# Patient Record
Sex: FEMALE | Race: BLACK OR AFRICAN AMERICAN | NOT HISPANIC OR LATINO | Employment: FULL TIME | URBAN - METROPOLITAN AREA
[De-identification: names, ages, dates, MRNs, and addresses within clinical notes are randomized per-mention and may not be internally consistent; named-entity substitution may affect disease eponyms.]

---

## 2017-01-23 ENCOUNTER — COMMUNICATION - HEALTHEAST (OUTPATIENT)
Dept: FAMILY MEDICINE | Facility: CLINIC | Age: 37
End: 2017-01-23

## 2017-01-23 DIAGNOSIS — K12.0 ORAL APHTHAE: ICD-10-CM

## 2017-01-23 DIAGNOSIS — J06.9 URI (UPPER RESPIRATORY INFECTION): ICD-10-CM

## 2017-03-01 ENCOUNTER — COMMUNICATION - HEALTHEAST (OUTPATIENT)
Dept: FAMILY MEDICINE | Facility: CLINIC | Age: 37
End: 2017-03-01

## 2017-03-01 DIAGNOSIS — K12.0 ORAL APHTHAE: ICD-10-CM

## 2017-03-07 ENCOUNTER — COMMUNICATION - HEALTHEAST (OUTPATIENT)
Dept: FAMILY MEDICINE | Facility: CLINIC | Age: 37
End: 2017-03-07

## 2017-03-07 DIAGNOSIS — K12.0 ORAL APHTHAE: ICD-10-CM

## 2017-03-23 ENCOUNTER — COMMUNICATION - HEALTHEAST (OUTPATIENT)
Dept: FAMILY MEDICINE | Facility: CLINIC | Age: 37
End: 2017-03-23

## 2017-03-23 DIAGNOSIS — K12.0 ORAL APHTHAE: ICD-10-CM

## 2017-03-28 ENCOUNTER — COMMUNICATION - HEALTHEAST (OUTPATIENT)
Dept: FAMILY MEDICINE | Facility: CLINIC | Age: 37
End: 2017-03-28

## 2017-03-28 ENCOUNTER — COMMUNICATION - HEALTHEAST (OUTPATIENT)
Dept: SCHEDULING | Facility: CLINIC | Age: 37
End: 2017-03-28

## 2017-03-28 DIAGNOSIS — K12.0 ORAL APHTHAE: ICD-10-CM

## 2017-03-28 DIAGNOSIS — B00.1 COLD SORE: ICD-10-CM

## 2017-03-29 ENCOUNTER — COMMUNICATION - HEALTHEAST (OUTPATIENT)
Dept: FAMILY MEDICINE | Facility: CLINIC | Age: 37
End: 2017-03-29

## 2017-03-29 DIAGNOSIS — K12.0 ORAL APHTHAE: ICD-10-CM

## 2017-04-24 ENCOUNTER — COMMUNICATION - HEALTHEAST (OUTPATIENT)
Dept: SCHEDULING | Facility: CLINIC | Age: 37
End: 2017-04-24

## 2017-04-24 ENCOUNTER — COMMUNICATION - HEALTHEAST (OUTPATIENT)
Dept: FAMILY MEDICINE | Facility: CLINIC | Age: 37
End: 2017-04-24

## 2017-04-24 DIAGNOSIS — K12.0 ORAL APHTHAE: ICD-10-CM

## 2017-05-19 ENCOUNTER — COMMUNICATION - HEALTHEAST (OUTPATIENT)
Dept: FAMILY MEDICINE | Facility: CLINIC | Age: 37
End: 2017-05-19

## 2017-05-19 DIAGNOSIS — K12.0 ORAL APHTHAE: ICD-10-CM

## 2017-06-07 ENCOUNTER — COMMUNICATION - HEALTHEAST (OUTPATIENT)
Dept: FAMILY MEDICINE | Facility: CLINIC | Age: 37
End: 2017-06-07

## 2017-06-07 DIAGNOSIS — K12.0 ORAL APHTHAE: ICD-10-CM

## 2017-07-12 ENCOUNTER — COMMUNICATION - HEALTHEAST (OUTPATIENT)
Dept: FAMILY MEDICINE | Facility: CLINIC | Age: 37
End: 2017-07-12

## 2017-07-12 ENCOUNTER — OFFICE VISIT - HEALTHEAST (OUTPATIENT)
Dept: FAMILY MEDICINE | Facility: CLINIC | Age: 37
End: 2017-07-12

## 2017-07-12 DIAGNOSIS — K12.0 ORAL APHTHAE: ICD-10-CM

## 2017-07-12 DIAGNOSIS — B00.2 HERPETIC GINGIVOSTOMATITIS: ICD-10-CM

## 2017-07-31 ENCOUNTER — COMMUNICATION - HEALTHEAST (OUTPATIENT)
Dept: FAMILY MEDICINE | Facility: CLINIC | Age: 37
End: 2017-07-31

## 2017-07-31 DIAGNOSIS — B00.2 HERPETIC GINGIVOSTOMATITIS: ICD-10-CM

## 2017-08-01 ENCOUNTER — COMMUNICATION - HEALTHEAST (OUTPATIENT)
Dept: FAMILY MEDICINE | Facility: CLINIC | Age: 37
End: 2017-08-01

## 2017-08-01 DIAGNOSIS — B00.2 HERPETIC GINGIVOSTOMATITIS: ICD-10-CM

## 2017-08-08 ENCOUNTER — COMMUNICATION - HEALTHEAST (OUTPATIENT)
Dept: FAMILY MEDICINE | Facility: CLINIC | Age: 37
End: 2017-08-08

## 2017-08-08 ENCOUNTER — RECORDS - HEALTHEAST (OUTPATIENT)
Dept: ADMINISTRATIVE | Facility: OTHER | Age: 37
End: 2017-08-08

## 2017-08-09 ENCOUNTER — COMMUNICATION - HEALTHEAST (OUTPATIENT)
Dept: FAMILY MEDICINE | Facility: CLINIC | Age: 37
End: 2017-08-09

## 2017-08-11 ENCOUNTER — OFFICE VISIT - HEALTHEAST (OUTPATIENT)
Dept: FAMILY MEDICINE | Facility: CLINIC | Age: 37
End: 2017-08-11

## 2017-08-11 DIAGNOSIS — B00.1 COLD SORE: ICD-10-CM

## 2017-08-11 DIAGNOSIS — K12.0 ORAL APHTHAE: ICD-10-CM

## 2017-08-11 DIAGNOSIS — B00.2 HERPETIC GINGIVOSTOMATITIS: ICD-10-CM

## 2017-08-11 ASSESSMENT — MIFFLIN-ST. JEOR: SCORE: 1230.6

## 2017-08-31 ENCOUNTER — OFFICE VISIT - HEALTHEAST (OUTPATIENT)
Dept: FAMILY MEDICINE | Facility: CLINIC | Age: 37
End: 2017-08-31

## 2017-08-31 DIAGNOSIS — M32.9 DISEASE OF GINGIVA DUE TO LUPUS ERYTHEMATOSUS (H): ICD-10-CM

## 2017-08-31 DIAGNOSIS — K06.9 DISEASE OF GINGIVA DUE TO LUPUS ERYTHEMATOSUS (H): ICD-10-CM

## 2017-08-31 ASSESSMENT — MIFFLIN-ST. JEOR: SCORE: 1249.88

## 2017-09-01 ENCOUNTER — COMMUNICATION - HEALTHEAST (OUTPATIENT)
Dept: FAMILY MEDICINE | Facility: CLINIC | Age: 37
End: 2017-09-01

## 2017-09-06 ENCOUNTER — COMMUNICATION - HEALTHEAST (OUTPATIENT)
Dept: FAMILY MEDICINE | Facility: CLINIC | Age: 37
End: 2017-09-06

## 2017-09-06 DIAGNOSIS — K12.0 ORAL APHTHAE: ICD-10-CM

## 2017-09-06 DIAGNOSIS — R76.8 ELEVATED ANTINUCLEAR ANTIBODY (ANA) LEVEL: ICD-10-CM

## 2017-09-11 ENCOUNTER — COMMUNICATION - HEALTHEAST (OUTPATIENT)
Dept: FAMILY MEDICINE | Facility: CLINIC | Age: 37
End: 2017-09-11

## 2017-09-11 DIAGNOSIS — K06.9 DISEASE OF GINGIVA DUE TO LUPUS ERYTHEMATOSUS (H): ICD-10-CM

## 2017-09-11 DIAGNOSIS — M32.9 DISEASE OF GINGIVA DUE TO LUPUS ERYTHEMATOSUS (H): ICD-10-CM

## 2017-09-15 ENCOUNTER — COMMUNICATION - HEALTHEAST (OUTPATIENT)
Dept: FAMILY MEDICINE | Facility: CLINIC | Age: 37
End: 2017-09-15

## 2017-09-15 DIAGNOSIS — K06.9 DISEASE OF GINGIVA DUE TO LUPUS ERYTHEMATOSUS (H): ICD-10-CM

## 2017-09-15 DIAGNOSIS — M32.9 DISEASE OF GINGIVA DUE TO LUPUS ERYTHEMATOSUS (H): ICD-10-CM

## 2017-09-26 ENCOUNTER — COMMUNICATION - HEALTHEAST (OUTPATIENT)
Dept: FAMILY MEDICINE | Facility: CLINIC | Age: 37
End: 2017-09-26

## 2017-09-26 DIAGNOSIS — M32.9 DISEASE OF GINGIVA DUE TO LUPUS ERYTHEMATOSUS (H): ICD-10-CM

## 2017-09-26 DIAGNOSIS — K06.9 DISEASE OF GINGIVA DUE TO LUPUS ERYTHEMATOSUS (H): ICD-10-CM

## 2017-09-29 ENCOUNTER — COMMUNICATION - HEALTHEAST (OUTPATIENT)
Dept: FAMILY MEDICINE | Facility: CLINIC | Age: 37
End: 2017-09-29

## 2017-09-29 DIAGNOSIS — M32.9 DISEASE OF GINGIVA DUE TO LUPUS ERYTHEMATOSUS (H): ICD-10-CM

## 2017-09-29 DIAGNOSIS — K06.9 DISEASE OF GINGIVA DUE TO LUPUS ERYTHEMATOSUS (H): ICD-10-CM

## 2017-10-01 ENCOUNTER — COMMUNICATION - HEALTHEAST (OUTPATIENT)
Dept: FAMILY MEDICINE | Facility: CLINIC | Age: 37
End: 2017-10-01

## 2017-10-01 DIAGNOSIS — K06.9 DISEASE OF GINGIVA DUE TO LUPUS ERYTHEMATOSUS (H): ICD-10-CM

## 2017-10-01 DIAGNOSIS — M32.9 DISEASE OF GINGIVA DUE TO LUPUS ERYTHEMATOSUS (H): ICD-10-CM

## 2017-10-02 ENCOUNTER — OFFICE VISIT - HEALTHEAST (OUTPATIENT)
Dept: FAMILY MEDICINE | Facility: CLINIC | Age: 37
End: 2017-10-02

## 2017-10-02 DIAGNOSIS — S09.93XA: ICD-10-CM

## 2017-10-02 DIAGNOSIS — H57.9 VISUAL SYMPTOMS: ICD-10-CM

## 2017-10-02 DIAGNOSIS — K06.9 DISEASE OF GINGIVA DUE TO LUPUS ERYTHEMATOSUS (H): ICD-10-CM

## 2017-10-02 DIAGNOSIS — M32.9 DISEASE OF GINGIVA DUE TO LUPUS ERYTHEMATOSUS (H): ICD-10-CM

## 2017-10-02 DIAGNOSIS — R76.8 ELEVATED ANTINUCLEAR ANTIBODY (ANA) LEVEL: ICD-10-CM

## 2017-10-02 ASSESSMENT — MIFFLIN-ST. JEOR: SCORE: 1218.13

## 2017-10-18 ENCOUNTER — COMMUNICATION - HEALTHEAST (OUTPATIENT)
Dept: FAMILY MEDICINE | Facility: CLINIC | Age: 37
End: 2017-10-18

## 2017-10-23 ENCOUNTER — COMMUNICATION - HEALTHEAST (OUTPATIENT)
Dept: FAMILY MEDICINE | Facility: CLINIC | Age: 37
End: 2017-10-23

## 2017-10-23 DIAGNOSIS — K06.9 DISEASE OF GINGIVA DUE TO LUPUS ERYTHEMATOSUS (H): ICD-10-CM

## 2017-10-23 DIAGNOSIS — M32.9 DISEASE OF GINGIVA DUE TO LUPUS ERYTHEMATOSUS (H): ICD-10-CM

## 2017-11-18 ENCOUNTER — COMMUNICATION - HEALTHEAST (OUTPATIENT)
Dept: FAMILY MEDICINE | Facility: CLINIC | Age: 37
End: 2017-11-18

## 2017-11-18 DIAGNOSIS — R76.8 ELEVATED ANTINUCLEAR ANTIBODY (ANA) LEVEL: ICD-10-CM

## 2017-11-24 ENCOUNTER — COMMUNICATION - HEALTHEAST (OUTPATIENT)
Dept: FAMILY MEDICINE | Facility: CLINIC | Age: 37
End: 2017-11-24

## 2017-11-24 DIAGNOSIS — M32.9 DISEASE OF GINGIVA DUE TO LUPUS ERYTHEMATOSUS (H): ICD-10-CM

## 2017-11-24 DIAGNOSIS — K06.9 DISEASE OF GINGIVA DUE TO LUPUS ERYTHEMATOSUS (H): ICD-10-CM

## 2017-12-27 ENCOUNTER — COMMUNICATION - HEALTHEAST (OUTPATIENT)
Dept: FAMILY MEDICINE | Facility: CLINIC | Age: 37
End: 2017-12-27

## 2017-12-27 DIAGNOSIS — K06.9 DISEASE OF GINGIVA DUE TO LUPUS ERYTHEMATOSUS (H): ICD-10-CM

## 2017-12-27 DIAGNOSIS — M32.9 DISEASE OF GINGIVA DUE TO LUPUS ERYTHEMATOSUS (H): ICD-10-CM

## 2018-01-05 ENCOUNTER — COMMUNICATION - HEALTHEAST (OUTPATIENT)
Dept: FAMILY MEDICINE | Facility: CLINIC | Age: 38
End: 2018-01-05

## 2018-01-05 DIAGNOSIS — K06.9 DISEASE OF GINGIVA DUE TO LUPUS ERYTHEMATOSUS (H): ICD-10-CM

## 2018-01-05 DIAGNOSIS — M32.9 DISEASE OF GINGIVA DUE TO LUPUS ERYTHEMATOSUS (H): ICD-10-CM

## 2018-01-11 ENCOUNTER — COMMUNICATION - HEALTHEAST (OUTPATIENT)
Dept: FAMILY MEDICINE | Facility: CLINIC | Age: 38
End: 2018-01-11

## 2018-01-11 DIAGNOSIS — K06.9 DISEASE OF GINGIVA DUE TO LUPUS ERYTHEMATOSUS (H): ICD-10-CM

## 2018-01-11 DIAGNOSIS — M32.9 DISEASE OF GINGIVA DUE TO LUPUS ERYTHEMATOSUS (H): ICD-10-CM

## 2018-01-26 ENCOUNTER — COMMUNICATION - HEALTHEAST (OUTPATIENT)
Dept: FAMILY MEDICINE | Facility: CLINIC | Age: 38
End: 2018-01-26

## 2018-01-26 DIAGNOSIS — K06.9 DISEASE OF GINGIVA DUE TO LUPUS ERYTHEMATOSUS (H): ICD-10-CM

## 2018-01-26 DIAGNOSIS — M32.9 DISEASE OF GINGIVA DUE TO LUPUS ERYTHEMATOSUS (H): ICD-10-CM

## 2018-02-08 ENCOUNTER — COMMUNICATION - HEALTHEAST (OUTPATIENT)
Dept: FAMILY MEDICINE | Facility: CLINIC | Age: 38
End: 2018-02-08

## 2018-02-08 DIAGNOSIS — K06.9 DISEASE OF GINGIVA DUE TO LUPUS ERYTHEMATOSUS (H): ICD-10-CM

## 2018-02-08 DIAGNOSIS — M32.9 DISEASE OF GINGIVA DUE TO LUPUS ERYTHEMATOSUS (H): ICD-10-CM

## 2018-02-15 ENCOUNTER — COMMUNICATION - HEALTHEAST (OUTPATIENT)
Dept: FAMILY MEDICINE | Facility: CLINIC | Age: 38
End: 2018-02-15

## 2018-02-15 DIAGNOSIS — M32.9 DISEASE OF GINGIVA DUE TO LUPUS ERYTHEMATOSUS (H): ICD-10-CM

## 2018-02-15 DIAGNOSIS — K06.9 DISEASE OF GINGIVA DUE TO LUPUS ERYTHEMATOSUS (H): ICD-10-CM

## 2018-03-21 ENCOUNTER — COMMUNICATION - HEALTHEAST (OUTPATIENT)
Dept: FAMILY MEDICINE | Facility: CLINIC | Age: 38
End: 2018-03-21

## 2018-03-21 DIAGNOSIS — K06.9 DISEASE OF GINGIVA DUE TO LUPUS ERYTHEMATOSUS (H): ICD-10-CM

## 2018-03-21 DIAGNOSIS — M32.9 DISEASE OF GINGIVA DUE TO LUPUS ERYTHEMATOSUS (H): ICD-10-CM

## 2018-03-23 ENCOUNTER — COMMUNICATION - HEALTHEAST (OUTPATIENT)
Dept: FAMILY MEDICINE | Facility: CLINIC | Age: 38
End: 2018-03-23

## 2018-03-23 DIAGNOSIS — M32.9 DISEASE OF GINGIVA DUE TO LUPUS ERYTHEMATOSUS (H): ICD-10-CM

## 2018-03-23 DIAGNOSIS — K06.9 DISEASE OF GINGIVA DUE TO LUPUS ERYTHEMATOSUS (H): ICD-10-CM

## 2018-04-16 ENCOUNTER — COMMUNICATION - HEALTHEAST (OUTPATIENT)
Dept: FAMILY MEDICINE | Facility: CLINIC | Age: 38
End: 2018-04-16

## 2018-04-16 DIAGNOSIS — K06.9 DISEASE OF GINGIVA DUE TO LUPUS ERYTHEMATOSUS (H): ICD-10-CM

## 2018-04-16 DIAGNOSIS — M32.9 DISEASE OF GINGIVA DUE TO LUPUS ERYTHEMATOSUS (H): ICD-10-CM

## 2018-04-17 ENCOUNTER — COMMUNICATION - HEALTHEAST (OUTPATIENT)
Dept: FAMILY MEDICINE | Facility: CLINIC | Age: 38
End: 2018-04-17

## 2018-04-17 DIAGNOSIS — K06.9 DISEASE OF GINGIVA DUE TO LUPUS ERYTHEMATOSUS (H): ICD-10-CM

## 2018-04-17 DIAGNOSIS — M32.9 DISEASE OF GINGIVA DUE TO LUPUS ERYTHEMATOSUS (H): ICD-10-CM

## 2018-04-23 ENCOUNTER — OFFICE VISIT - HEALTHEAST (OUTPATIENT)
Dept: FAMILY MEDICINE | Facility: CLINIC | Age: 38
End: 2018-04-23

## 2018-04-23 ENCOUNTER — AMBULATORY - HEALTHEAST (OUTPATIENT)
Dept: FAMILY MEDICINE | Facility: CLINIC | Age: 38
End: 2018-04-23

## 2018-04-23 DIAGNOSIS — R10.32 LEFT LOWER QUADRANT PAIN: ICD-10-CM

## 2018-04-23 DIAGNOSIS — M32.9 DISEASE OF GINGIVA DUE TO LUPUS ERYTHEMATOSUS (H): ICD-10-CM

## 2018-04-23 DIAGNOSIS — R29.898 LEG WEAKNESS: ICD-10-CM

## 2018-04-23 DIAGNOSIS — K13.79 ACUTE PAIN OF MOUTH: ICD-10-CM

## 2018-04-23 DIAGNOSIS — R76.8 ELEVATED ANTINUCLEAR ANTIBODY (ANA) LEVEL: ICD-10-CM

## 2018-04-23 DIAGNOSIS — R35.0 URINARY FREQUENCY: ICD-10-CM

## 2018-04-23 DIAGNOSIS — K06.9 DISEASE OF GINGIVA DUE TO LUPUS ERYTHEMATOSUS (H): ICD-10-CM

## 2018-04-23 DIAGNOSIS — D50.9 IRON DEFICIENCY ANEMIA: ICD-10-CM

## 2018-04-23 LAB
ALBUMIN SERPL-MCNC: 3.9 G/DL (ref 3.5–5)
ALBUMIN UR-MCNC: NEGATIVE MG/DL
ALP SERPL-CCNC: 88 U/L (ref 45–120)
ALT SERPL W P-5'-P-CCNC: 11 U/L (ref 0–45)
AMPHETAMINES UR QL SCN: NORMAL
ANION GAP SERPL CALCULATED.3IONS-SCNC: 11 MMOL/L (ref 5–18)
APPEARANCE UR: CLEAR
AST SERPL W P-5'-P-CCNC: 13 U/L (ref 0–40)
BACTERIA #/AREA URNS HPF: ABNORMAL HPF
BARBITURATES UR QL: NORMAL
BENZODIAZ UR QL: NORMAL
BILIRUB SERPL-MCNC: 0.4 MG/DL (ref 0–1)
BILIRUB UR QL STRIP: NEGATIVE
BUN SERPL-MCNC: 18 MG/DL (ref 8–22)
C REACTIVE PROTEIN LHE: 0.9 MG/DL (ref 0–0.8)
CALCIUM SERPL-MCNC: 9.2 MG/DL (ref 8.5–10.5)
CANNABINOIDS UR QL SCN: NORMAL
CHLORIDE BLD-SCNC: 106 MMOL/L (ref 98–107)
CO2 SERPL-SCNC: 21 MMOL/L (ref 22–31)
COCAINE UR QL: NORMAL
COLOR UR AUTO: YELLOW
CREAT SERPL-MCNC: 0.57 MG/DL (ref 0.6–1.1)
CREAT UR-MCNC: 112.8 MG/DL
ERYTHROCYTE [DISTWIDTH] IN BLOOD BY AUTOMATED COUNT: 17.1 % (ref 11–14.5)
ERYTHROCYTE [SEDIMENTATION RATE] IN BLOOD BY WESTERGREN METHOD: 37 MM/HR (ref 0–20)
FERRITIN SERPL-MCNC: 11 NG/ML (ref 10–130)
GFR SERPL CREATININE-BSD FRML MDRD: >60 ML/MIN/1.73M2
GLUCOSE BLD-MCNC: 81 MG/DL (ref 70–125)
GLUCOSE UR STRIP-MCNC: NEGATIVE MG/DL
HCT VFR BLD AUTO: 26.7 % (ref 35–47)
HGB BLD-MCNC: 8.6 G/DL (ref 12–16)
HGB UR QL STRIP: ABNORMAL
KETONES UR STRIP-MCNC: NEGATIVE MG/DL
LEUKOCYTE ESTERASE UR QL STRIP: NEGATIVE
MCH RBC QN AUTO: 25.9 PG (ref 27–34)
MCHC RBC AUTO-ENTMCNC: 32.2 G/DL (ref 32–36)
MCV RBC AUTO: 80 FL (ref 80–100)
METHADONE UR QL SCN: NORMAL
NITRATE UR QL: NEGATIVE
OPIATES UR QL SCN: NORMAL
OXYCODONE UR QL: NORMAL
PCP UR QL SCN: NORMAL
PH UR STRIP: 6 [PH] (ref 5–8)
PLATELET # BLD AUTO: 518 THOU/UL (ref 140–440)
PMV BLD AUTO: 6.9 FL (ref 7–10)
POTASSIUM BLD-SCNC: 4 MMOL/L (ref 3.5–5)
PROT SERPL-MCNC: 7.9 G/DL (ref 6–8)
RBC # BLD AUTO: 3.32 MILL/UL (ref 3.8–5.4)
RBC #/AREA URNS AUTO: ABNORMAL HPF
SODIUM SERPL-SCNC: 138 MMOL/L (ref 136–145)
SP GR UR STRIP: 1.02 (ref 1–1.03)
SQUAMOUS #/AREA URNS AUTO: ABNORMAL LPF
TSH SERPL DL<=0.005 MIU/L-ACNC: 0.47 UIU/ML (ref 0.3–5)
UROBILINOGEN UR STRIP-ACNC: ABNORMAL
WBC #/AREA URNS AUTO: ABNORMAL HPF
WBC: 4.4 THOU/UL (ref 4–11)

## 2018-04-23 ASSESSMENT — MIFFLIN-ST. JEOR: SCORE: 1286.17

## 2018-04-24 LAB — ANA SER QL: 1.1 U

## 2018-04-25 LAB — BACTERIA SPEC CULT: ABNORMAL

## 2018-05-07 ENCOUNTER — COMMUNICATION - HEALTHEAST (OUTPATIENT)
Dept: FAMILY MEDICINE | Facility: CLINIC | Age: 38
End: 2018-05-07

## 2018-05-07 DIAGNOSIS — K06.9 DISEASE OF GINGIVA DUE TO LUPUS ERYTHEMATOSUS (H): ICD-10-CM

## 2018-05-07 DIAGNOSIS — M32.9 DISEASE OF GINGIVA DUE TO LUPUS ERYTHEMATOSUS (H): ICD-10-CM

## 2018-05-09 ENCOUNTER — COMMUNICATION - HEALTHEAST (OUTPATIENT)
Dept: FAMILY MEDICINE | Facility: CLINIC | Age: 38
End: 2018-05-09

## 2018-05-09 DIAGNOSIS — M32.9 DISEASE OF GINGIVA DUE TO LUPUS ERYTHEMATOSUS (H): ICD-10-CM

## 2018-05-09 DIAGNOSIS — K06.9 DISEASE OF GINGIVA DUE TO LUPUS ERYTHEMATOSUS (H): ICD-10-CM

## 2018-05-16 ENCOUNTER — OFFICE VISIT - HEALTHEAST (OUTPATIENT)
Dept: FAMILY MEDICINE | Facility: CLINIC | Age: 38
End: 2018-05-16

## 2018-05-16 DIAGNOSIS — K06.9 DISEASE OF GINGIVA DUE TO LUPUS ERYTHEMATOSUS (H): ICD-10-CM

## 2018-05-16 DIAGNOSIS — D50.9 IRON DEFICIENCY ANEMIA, UNSPECIFIED IRON DEFICIENCY ANEMIA TYPE: ICD-10-CM

## 2018-05-16 DIAGNOSIS — M32.9 DISEASE OF GINGIVA DUE TO LUPUS ERYTHEMATOSUS (H): ICD-10-CM

## 2018-05-16 ASSESSMENT — MIFFLIN-ST. JEOR: SCORE: 1275.96

## 2018-05-17 ENCOUNTER — COMMUNICATION - HEALTHEAST (OUTPATIENT)
Dept: ONCOLOGY | Facility: CLINIC | Age: 38
End: 2018-05-17

## 2018-05-24 ENCOUNTER — COMMUNICATION - HEALTHEAST (OUTPATIENT)
Dept: FAMILY MEDICINE | Facility: CLINIC | Age: 38
End: 2018-05-24

## 2018-05-24 ENCOUNTER — COMMUNICATION - HEALTHEAST (OUTPATIENT)
Dept: SCHEDULING | Facility: CLINIC | Age: 38
End: 2018-05-24

## 2018-05-24 ENCOUNTER — HOSPITAL ENCOUNTER (OUTPATIENT)
Dept: ULTRASOUND IMAGING | Facility: CLINIC | Age: 38
Discharge: HOME | End: 2018-05-24
Attending: FAMILY MEDICINE

## 2018-05-24 ENCOUNTER — HOSPITAL ENCOUNTER (OUTPATIENT)
Dept: MRI IMAGING | Facility: CLINIC | Age: 38
Discharge: HOME | End: 2018-05-24
Attending: FAMILY MEDICINE

## 2018-05-24 DIAGNOSIS — R10.32 LEFT LOWER QUADRANT PAIN: ICD-10-CM

## 2018-05-24 DIAGNOSIS — K06.9 DISEASE OF GINGIVA DUE TO LUPUS ERYTHEMATOSUS (H): ICD-10-CM

## 2018-05-24 DIAGNOSIS — R29.898 LEG WEAKNESS: ICD-10-CM

## 2018-05-24 DIAGNOSIS — M32.9 DISEASE OF GINGIVA DUE TO LUPUS ERYTHEMATOSUS (H): ICD-10-CM

## 2018-05-29 ENCOUNTER — COMMUNICATION - HEALTHEAST (OUTPATIENT)
Dept: ADMINISTRATIVE | Facility: HOSPITAL | Age: 38
End: 2018-05-29

## 2018-05-29 ENCOUNTER — AMBULATORY - HEALTHEAST (OUTPATIENT)
Dept: RHEUMATOLOGY | Facility: CLINIC | Age: 38
End: 2018-05-29

## 2018-06-01 ENCOUNTER — COMMUNICATION - HEALTHEAST (OUTPATIENT)
Dept: SCHEDULING | Facility: CLINIC | Age: 38
End: 2018-06-01

## 2018-06-01 DIAGNOSIS — K06.9 DISEASE OF GINGIVA DUE TO LUPUS ERYTHEMATOSUS (H): ICD-10-CM

## 2018-06-01 DIAGNOSIS — K12.0 ORAL APHTHAE: ICD-10-CM

## 2018-06-01 DIAGNOSIS — M32.9 DISEASE OF GINGIVA DUE TO LUPUS ERYTHEMATOSUS (H): ICD-10-CM

## 2018-06-04 ENCOUNTER — COMMUNICATION - HEALTHEAST (OUTPATIENT)
Dept: FAMILY MEDICINE | Facility: CLINIC | Age: 38
End: 2018-06-04

## 2018-06-04 DIAGNOSIS — M32.9 DISEASE OF GINGIVA DUE TO LUPUS ERYTHEMATOSUS (H): ICD-10-CM

## 2018-06-04 DIAGNOSIS — K06.9 DISEASE OF GINGIVA DUE TO LUPUS ERYTHEMATOSUS (H): ICD-10-CM

## 2018-06-05 ENCOUNTER — OFFICE VISIT - HEALTHEAST (OUTPATIENT)
Dept: RHEUMATOLOGY | Facility: CLINIC | Age: 38
End: 2018-06-05

## 2018-06-05 ENCOUNTER — RECORDS - HEALTHEAST (OUTPATIENT)
Dept: GENERAL RADIOLOGY | Facility: CLINIC | Age: 38
End: 2018-06-05

## 2018-06-05 DIAGNOSIS — R29.898 WEAKNESS OF BOTH LOWER EXTREMITIES: ICD-10-CM

## 2018-06-05 DIAGNOSIS — G89.29 OTHER CHRONIC PAIN: ICD-10-CM

## 2018-06-05 DIAGNOSIS — M54.50 LOW BACK PAIN: ICD-10-CM

## 2018-06-05 DIAGNOSIS — G89.29 CHRONIC BILATERAL LOW BACK PAIN WITHOUT SCIATICA: ICD-10-CM

## 2018-06-05 DIAGNOSIS — K12.0 RECURRENT APHTHOUS ULCER: ICD-10-CM

## 2018-06-05 DIAGNOSIS — R21 RASH: ICD-10-CM

## 2018-06-05 DIAGNOSIS — M54.50 CHRONIC BILATERAL LOW BACK PAIN WITHOUT SCIATICA: ICD-10-CM

## 2018-06-05 DIAGNOSIS — M54.9 UPPER BACK PAIN: ICD-10-CM

## 2018-06-05 DIAGNOSIS — D64.9 ANEMIA: ICD-10-CM

## 2018-06-05 DIAGNOSIS — R76.8 ANA POSITIVE: ICD-10-CM

## 2018-06-05 LAB
C REACTIVE PROTEIN LHE: 8.1 MG/DL (ref 0–0.8)
CK SERPL-CCNC: 29 U/L (ref 30–190)
CREAT UR-MCNC: 295.3 MG/DL
ERYTHROCYTE [SEDIMENTATION RATE] IN BLOOD BY WESTERGREN METHOD: 60 MM/HR (ref 0–20)
HIV 1+2 AB+HIV1 P24 AG SERPL QL IA: NEGATIVE
MICROALBUMIN UR-MCNC: 377.23 MG/DL (ref 0–1.99)
MICROALBUMIN/CREAT UR: 1277.4 MG/G

## 2018-06-05 ASSESSMENT — MIFFLIN-ST. JEOR: SCORE: 1257.82

## 2018-06-06 ENCOUNTER — COMMUNICATION - HEALTHEAST (OUTPATIENT)
Dept: ADMINISTRATIVE | Facility: HOSPITAL | Age: 38
End: 2018-06-06

## 2018-06-06 LAB
HSV1 IGG SERPL QL IA: POSITIVE
HSV2 IGG SERPL QL IA: POSITIVE

## 2018-06-07 ENCOUNTER — COMMUNICATION - HEALTHEAST (OUTPATIENT)
Dept: FAMILY MEDICINE | Facility: CLINIC | Age: 38
End: 2018-06-07

## 2018-06-07 DIAGNOSIS — M32.9 DISEASE OF GINGIVA DUE TO LUPUS ERYTHEMATOSUS (H): ICD-10-CM

## 2018-06-07 DIAGNOSIS — K06.9 DISEASE OF GINGIVA DUE TO LUPUS ERYTHEMATOSUS (H): ICD-10-CM

## 2018-06-07 LAB
ACE SERPL-CCNC: 12 U/L (ref 9–67)
CARDIOLIPIN IGA SER IA-ACNC: 0.6 APL U/ML (ref 0–19.9)
CARDIOLIPIN IGG SER IA-ACNC: <1.6 GPL-U/ML (ref 0–19.9)
CARDIOLIPIN IGM SER IA-ACNC: 1.6 MPL-U/ML (ref 0–19.9)

## 2018-06-08 ENCOUNTER — AMBULATORY - HEALTHEAST (OUTPATIENT)
Dept: RHEUMATOLOGY | Facility: CLINIC | Age: 38
End: 2018-06-08

## 2018-06-08 ENCOUNTER — COMMUNICATION - HEALTHEAST (OUTPATIENT)
Dept: RHEUMATOLOGY | Facility: CLINIC | Age: 38
End: 2018-06-08

## 2018-06-08 ENCOUNTER — COMMUNICATION - HEALTHEAST (OUTPATIENT)
Dept: ADMINISTRATIVE | Facility: HOSPITAL | Age: 38
End: 2018-06-08

## 2018-06-08 DIAGNOSIS — R80.9 PROTEINURIA: ICD-10-CM

## 2018-06-08 LAB
ANCA IGG TITR SER IF: NORMAL {TITER}
LA PPP-IMP: NEGATIVE

## 2018-06-11 LAB
B LOCUS: NORMAL
B27TEST METHOD: NORMAL

## 2018-06-12 LAB
DNA (DS) ANTIBODY - HISTORICAL: 3 IU
HSV IGM ANTIBODY: 1.55 INDEX VALUE (ref 0–0.89)
SM (SMITH AUTOANTIBODIES - HISTORICAL: 15 EU
SM/RNP AUTOANTIBODIES - HISTORICAL: 2 EU

## 2018-06-19 ENCOUNTER — COMMUNICATION - HEALTHEAST (OUTPATIENT)
Dept: FAMILY MEDICINE | Facility: CLINIC | Age: 38
End: 2018-06-19

## 2018-06-19 ENCOUNTER — COMMUNICATION - HEALTHEAST (OUTPATIENT)
Dept: SCHEDULING | Facility: CLINIC | Age: 38
End: 2018-06-19

## 2018-06-20 ENCOUNTER — COMMUNICATION - HEALTHEAST (OUTPATIENT)
Dept: ADMINISTRATIVE | Facility: HOSPITAL | Age: 38
End: 2018-06-20

## 2018-06-20 ENCOUNTER — COMMUNICATION - HEALTHEAST (OUTPATIENT)
Dept: FAMILY MEDICINE | Facility: CLINIC | Age: 38
End: 2018-06-20

## 2018-06-20 ENCOUNTER — OFFICE VISIT - HEALTHEAST (OUTPATIENT)
Dept: FAMILY MEDICINE | Facility: CLINIC | Age: 38
End: 2018-06-20

## 2018-06-20 DIAGNOSIS — M79.661 PAIN IN BOTH LOWER LEGS: ICD-10-CM

## 2018-06-20 DIAGNOSIS — M32.9 DISEASE OF GINGIVA DUE TO LUPUS ERYTHEMATOSUS (H): ICD-10-CM

## 2018-06-20 DIAGNOSIS — M79.662 PAIN IN BOTH LOWER LEGS: ICD-10-CM

## 2018-06-20 DIAGNOSIS — B00.9 HERPES SIMPLEX VIRUS INFECTION: ICD-10-CM

## 2018-06-20 DIAGNOSIS — K06.9 DISEASE OF GINGIVA DUE TO LUPUS ERYTHEMATOSUS (H): ICD-10-CM

## 2018-06-20 ASSESSMENT — MIFFLIN-ST. JEOR: SCORE: 1316.79

## 2018-06-25 ENCOUNTER — AMBULATORY - HEALTHEAST (OUTPATIENT)
Dept: ONCOLOGY | Facility: HOSPITAL | Age: 38
End: 2018-06-25

## 2018-06-25 ENCOUNTER — COMMUNICATION - HEALTHEAST (OUTPATIENT)
Dept: ADMINISTRATIVE | Facility: HOSPITAL | Age: 38
End: 2018-06-25

## 2018-07-09 ENCOUNTER — COMMUNICATION - HEALTHEAST (OUTPATIENT)
Dept: FAMILY MEDICINE | Facility: CLINIC | Age: 38
End: 2018-07-09

## 2018-07-09 DIAGNOSIS — K06.9 DISEASE OF GINGIVA DUE TO LUPUS ERYTHEMATOSUS (H): ICD-10-CM

## 2018-07-09 DIAGNOSIS — M32.9 DISEASE OF GINGIVA DUE TO LUPUS ERYTHEMATOSUS (H): ICD-10-CM

## 2018-07-10 ENCOUNTER — COMMUNICATION - HEALTHEAST (OUTPATIENT)
Dept: SCHEDULING | Facility: CLINIC | Age: 38
End: 2018-07-10

## 2018-07-10 ENCOUNTER — COMMUNICATION - HEALTHEAST (OUTPATIENT)
Dept: ADMINISTRATIVE | Facility: CLINIC | Age: 38
End: 2018-07-10

## 2018-07-10 ENCOUNTER — RECORDS - HEALTHEAST (OUTPATIENT)
Dept: GENERAL RADIOLOGY | Facility: CLINIC | Age: 38
End: 2018-07-10

## 2018-07-10 ENCOUNTER — COMMUNICATION - HEALTHEAST (OUTPATIENT)
Dept: FAMILY MEDICINE | Facility: CLINIC | Age: 38
End: 2018-07-10

## 2018-07-10 ENCOUNTER — OFFICE VISIT - HEALTHEAST (OUTPATIENT)
Dept: RHEUMATOLOGY | Facility: CLINIC | Age: 38
End: 2018-07-10

## 2018-07-10 DIAGNOSIS — M54.42 CHRONIC BILATERAL LOW BACK PAIN WITH LEFT-SIDED SCIATICA: ICD-10-CM

## 2018-07-10 DIAGNOSIS — K06.9 DISEASE OF GINGIVA DUE TO LUPUS ERYTHEMATOSUS (H): ICD-10-CM

## 2018-07-10 DIAGNOSIS — M32.9 DISEASE OF GINGIVA DUE TO LUPUS ERYTHEMATOSUS (H): ICD-10-CM

## 2018-07-10 DIAGNOSIS — R80.9 MICROALBUMINURIA: ICD-10-CM

## 2018-07-10 DIAGNOSIS — B00.9 HERPES SIMPLEX VIRUS (HSV) INFECTION: ICD-10-CM

## 2018-07-10 DIAGNOSIS — K12.0 ULCER APHTHOUS ORAL: ICD-10-CM

## 2018-07-10 DIAGNOSIS — M54.9 CHRONIC UPPER BACK PAIN: ICD-10-CM

## 2018-07-10 DIAGNOSIS — G89.29 CHRONIC BILATERAL LOW BACK PAIN WITH LEFT-SIDED SCIATICA: ICD-10-CM

## 2018-07-10 DIAGNOSIS — M35.9 UNDIFFERENTIATED CONNECTIVE TISSUE DISEASE (H): ICD-10-CM

## 2018-07-10 DIAGNOSIS — M54.9 DORSALGIA, UNSPECIFIED: ICD-10-CM

## 2018-07-10 DIAGNOSIS — G89.29 CHRONIC UPPER BACK PAIN: ICD-10-CM

## 2018-07-10 ASSESSMENT — MIFFLIN-ST. JEOR: SCORE: 1316.79

## 2018-07-11 ENCOUNTER — COMMUNICATION - HEALTHEAST (OUTPATIENT)
Dept: RHEUMATOLOGY | Facility: CLINIC | Age: 38
End: 2018-07-11

## 2018-07-13 ENCOUNTER — COMMUNICATION - HEALTHEAST (OUTPATIENT)
Dept: FAMILY MEDICINE | Facility: CLINIC | Age: 38
End: 2018-07-13

## 2018-07-16 ENCOUNTER — COMMUNICATION - HEALTHEAST (OUTPATIENT)
Dept: RHEUMATOLOGY | Facility: CLINIC | Age: 38
End: 2018-07-16

## 2018-07-27 ENCOUNTER — COMMUNICATION - HEALTHEAST (OUTPATIENT)
Dept: PHYSICAL MEDICINE AND REHAB | Facility: CLINIC | Age: 38
End: 2018-07-27

## 2018-08-03 ENCOUNTER — COMMUNICATION - HEALTHEAST (OUTPATIENT)
Dept: FAMILY MEDICINE | Facility: CLINIC | Age: 38
End: 2018-08-03

## 2018-08-03 ENCOUNTER — COMMUNICATION - HEALTHEAST (OUTPATIENT)
Dept: SCHEDULING | Facility: CLINIC | Age: 38
End: 2018-08-03

## 2018-08-03 DIAGNOSIS — K06.9 DISEASE OF GINGIVA DUE TO LUPUS ERYTHEMATOSUS (H): ICD-10-CM

## 2018-08-03 DIAGNOSIS — M32.9 DISEASE OF GINGIVA DUE TO LUPUS ERYTHEMATOSUS (H): ICD-10-CM

## 2018-08-13 ENCOUNTER — COMMUNICATION - HEALTHEAST (OUTPATIENT)
Dept: FAMILY MEDICINE | Facility: CLINIC | Age: 38
End: 2018-08-13

## 2018-08-13 DIAGNOSIS — M32.9 DISEASE OF GINGIVA DUE TO LUPUS ERYTHEMATOSUS (H): ICD-10-CM

## 2018-08-13 DIAGNOSIS — K06.9 DISEASE OF GINGIVA DUE TO LUPUS ERYTHEMATOSUS (H): ICD-10-CM

## 2018-08-21 ENCOUNTER — COMMUNICATION - HEALTHEAST (OUTPATIENT)
Dept: INFUSION THERAPY | Facility: HOSPITAL | Age: 38
End: 2018-08-21

## 2018-08-30 ENCOUNTER — COMMUNICATION - HEALTHEAST (OUTPATIENT)
Dept: FAMILY MEDICINE | Facility: CLINIC | Age: 38
End: 2018-08-30

## 2018-08-30 DIAGNOSIS — M32.9 DISEASE OF GINGIVA DUE TO LUPUS ERYTHEMATOSUS (H): ICD-10-CM

## 2018-08-30 DIAGNOSIS — K06.9 DISEASE OF GINGIVA DUE TO LUPUS ERYTHEMATOSUS (H): ICD-10-CM

## 2018-09-18 ENCOUNTER — COMMUNICATION - HEALTHEAST (OUTPATIENT)
Dept: FAMILY MEDICINE | Facility: CLINIC | Age: 38
End: 2018-09-18

## 2018-09-18 DIAGNOSIS — B00.9 HERPES SIMPLEX VIRUS INFECTION: ICD-10-CM

## 2018-10-08 ENCOUNTER — COMMUNICATION - HEALTHEAST (OUTPATIENT)
Dept: FAMILY MEDICINE | Facility: CLINIC | Age: 38
End: 2018-10-08

## 2018-10-08 DIAGNOSIS — M32.9 DISEASE OF GINGIVA DUE TO LUPUS ERYTHEMATOSUS (H): ICD-10-CM

## 2018-10-08 DIAGNOSIS — K06.9 DISEASE OF GINGIVA DUE TO LUPUS ERYTHEMATOSUS (H): ICD-10-CM

## 2018-10-17 ENCOUNTER — COMMUNICATION - HEALTHEAST (OUTPATIENT)
Dept: FAMILY MEDICINE | Facility: CLINIC | Age: 38
End: 2018-10-17

## 2018-10-17 DIAGNOSIS — M32.9 DISEASE OF GINGIVA DUE TO LUPUS ERYTHEMATOSUS (H): ICD-10-CM

## 2018-10-17 DIAGNOSIS — K06.9 DISEASE OF GINGIVA DUE TO LUPUS ERYTHEMATOSUS (H): ICD-10-CM

## 2018-10-18 ENCOUNTER — COMMUNICATION - HEALTHEAST (OUTPATIENT)
Dept: FAMILY MEDICINE | Facility: CLINIC | Age: 38
End: 2018-10-18

## 2018-10-18 DIAGNOSIS — M32.9 DISEASE OF GINGIVA DUE TO LUPUS ERYTHEMATOSUS (H): ICD-10-CM

## 2018-10-18 DIAGNOSIS — K06.9 DISEASE OF GINGIVA DUE TO LUPUS ERYTHEMATOSUS (H): ICD-10-CM

## 2018-10-22 ENCOUNTER — COMMUNICATION - HEALTHEAST (OUTPATIENT)
Dept: FAMILY MEDICINE | Facility: CLINIC | Age: 38
End: 2018-10-22

## 2018-10-29 ENCOUNTER — COMMUNICATION - HEALTHEAST (OUTPATIENT)
Dept: FAMILY MEDICINE | Facility: CLINIC | Age: 38
End: 2018-10-29

## 2018-10-29 DIAGNOSIS — K06.9 DISEASE OF GINGIVA DUE TO LUPUS ERYTHEMATOSUS (H): ICD-10-CM

## 2018-10-29 DIAGNOSIS — M32.9 DISEASE OF GINGIVA DUE TO LUPUS ERYTHEMATOSUS (H): ICD-10-CM

## 2018-11-05 ENCOUNTER — COMMUNICATION - HEALTHEAST (OUTPATIENT)
Dept: FAMILY MEDICINE | Facility: CLINIC | Age: 38
End: 2018-11-05

## 2018-11-05 DIAGNOSIS — K06.9 DISEASE OF GINGIVA DUE TO LUPUS ERYTHEMATOSUS (H): ICD-10-CM

## 2018-11-05 DIAGNOSIS — M32.9 DISEASE OF GINGIVA DUE TO LUPUS ERYTHEMATOSUS (H): ICD-10-CM

## 2018-11-29 ENCOUNTER — COMMUNICATION - HEALTHEAST (OUTPATIENT)
Dept: FAMILY MEDICINE | Facility: CLINIC | Age: 38
End: 2018-11-29

## 2018-11-29 DIAGNOSIS — B00.9 HERPES SIMPLEX VIRUS INFECTION: ICD-10-CM

## 2018-12-03 ENCOUNTER — COMMUNICATION - HEALTHEAST (OUTPATIENT)
Dept: FAMILY MEDICINE | Facility: CLINIC | Age: 38
End: 2018-12-03

## 2018-12-10 ENCOUNTER — COMMUNICATION - HEALTHEAST (OUTPATIENT)
Dept: FAMILY MEDICINE | Facility: CLINIC | Age: 38
End: 2018-12-10

## 2018-12-17 ENCOUNTER — COMMUNICATION - HEALTHEAST (OUTPATIENT)
Dept: FAMILY MEDICINE | Facility: CLINIC | Age: 38
End: 2018-12-17

## 2018-12-17 DIAGNOSIS — K06.9 DISEASE OF GINGIVA DUE TO LUPUS ERYTHEMATOSUS (H): ICD-10-CM

## 2018-12-17 DIAGNOSIS — M32.9 DISEASE OF GINGIVA DUE TO LUPUS ERYTHEMATOSUS (H): ICD-10-CM

## 2019-01-17 ENCOUNTER — OFFICE VISIT - HEALTHEAST (OUTPATIENT)
Dept: FAMILY MEDICINE | Facility: CLINIC | Age: 39
End: 2019-01-17

## 2019-01-17 DIAGNOSIS — M35.9 UNDIFFERENTIATED CONNECTIVE TISSUE DISEASE (H): ICD-10-CM

## 2019-01-17 DIAGNOSIS — K12.0 ULCER APHTHOUS ORAL: ICD-10-CM

## 2019-01-17 DIAGNOSIS — K06.9 DISEASE OF GINGIVA DUE TO LUPUS ERYTHEMATOSUS (H): ICD-10-CM

## 2019-01-17 DIAGNOSIS — T83.32XA INTRAUTERINE CONTRACEPTIVE DEVICE THREADS LOST, INITIAL ENCOUNTER: ICD-10-CM

## 2019-01-17 DIAGNOSIS — D50.9 IRON DEFICIENCY ANEMIA, UNSPECIFIED IRON DEFICIENCY ANEMIA TYPE: ICD-10-CM

## 2019-01-17 DIAGNOSIS — G47.00 INSOMNIA, UNSPECIFIED TYPE: ICD-10-CM

## 2019-01-17 DIAGNOSIS — M32.9 DISEASE OF GINGIVA DUE TO LUPUS ERYTHEMATOSUS (H): ICD-10-CM

## 2019-01-17 LAB
ALBUMIN SERPL-MCNC: 3.4 G/DL (ref 3.5–5)
ALP SERPL-CCNC: 70 U/L (ref 45–120)
ALT SERPL W P-5'-P-CCNC: <9 U/L (ref 0–45)
ANION GAP SERPL CALCULATED.3IONS-SCNC: 10 MMOL/L (ref 5–18)
AST SERPL W P-5'-P-CCNC: 14 U/L (ref 0–40)
BASOPHILS # BLD AUTO: 0.1 THOU/UL (ref 0–0.2)
BASOPHILS NFR BLD AUTO: 1 % (ref 0–2)
BILIRUB SERPL-MCNC: 0.1 MG/DL (ref 0–1)
BUN SERPL-MCNC: 10 MG/DL (ref 8–22)
C REACTIVE PROTEIN LHE: 0.7 MG/DL (ref 0–0.8)
CALCIUM SERPL-MCNC: 9.1 MG/DL (ref 8.5–10.5)
CHLORIDE BLD-SCNC: 108 MMOL/L (ref 98–107)
CO2 SERPL-SCNC: 23 MMOL/L (ref 22–31)
CREAT SERPL-MCNC: 0.62 MG/DL (ref 0.6–1.1)
EOSINOPHIL # BLD AUTO: 0.3 THOU/UL (ref 0–0.4)
EOSINOPHIL NFR BLD AUTO: 4 % (ref 0–6)
ERYTHROCYTE [DISTWIDTH] IN BLOOD BY AUTOMATED COUNT: 16.3 % (ref 11–14.5)
GFR SERPL CREATININE-BSD FRML MDRD: >60 ML/MIN/1.73M2
GLUCOSE BLD-MCNC: 70 MG/DL (ref 70–125)
HCT VFR BLD AUTO: 26.5 % (ref 35–47)
HGB BLD-MCNC: 8.1 G/DL (ref 12–16)
LYMPHOCYTES # BLD AUTO: 1.9 THOU/UL (ref 0.8–4.4)
LYMPHOCYTES NFR BLD AUTO: 28 % (ref 20–40)
MCH RBC QN AUTO: 23.1 PG (ref 27–34)
MCHC RBC AUTO-ENTMCNC: 30.7 G/DL (ref 32–36)
MCV RBC AUTO: 75 FL (ref 80–100)
MONOCYTES # BLD AUTO: 0.5 THOU/UL (ref 0–0.9)
MONOCYTES NFR BLD AUTO: 8 % (ref 2–10)
NEUTROPHILS # BLD AUTO: 3.9 THOU/UL (ref 2–7.7)
NEUTROPHILS NFR BLD AUTO: 59 % (ref 50–70)
PLATELET # BLD AUTO: 614 THOU/UL (ref 140–440)
PMV BLD AUTO: 6.7 FL (ref 7–10)
POTASSIUM BLD-SCNC: 4 MMOL/L (ref 3.5–5)
PROT SERPL-MCNC: 7.3 G/DL (ref 6–8)
RBC # BLD AUTO: 3.53 MILL/UL (ref 3.8–5.4)
SODIUM SERPL-SCNC: 141 MMOL/L (ref 136–145)
WBC: 6.6 THOU/UL (ref 4–11)

## 2019-01-17 RX ORDER — FERROUS GLUCONATE 324(38)MG
TABLET ORAL
Qty: 60 TABLET | Refills: 11 | Status: SHIPPED | OUTPATIENT
Start: 2019-01-17

## 2019-01-17 ASSESSMENT — MIFFLIN-ST. JEOR: SCORE: 1308.26

## 2019-01-22 ENCOUNTER — COMMUNICATION - HEALTHEAST (OUTPATIENT)
Dept: FAMILY MEDICINE | Facility: CLINIC | Age: 39
End: 2019-01-22

## 2019-02-06 ENCOUNTER — AMBULATORY - HEALTHEAST (OUTPATIENT)
Dept: FAMILY MEDICINE | Facility: CLINIC | Age: 39
End: 2019-02-06

## 2019-02-06 DIAGNOSIS — D50.9 IRON DEFICIENCY ANEMIA, UNSPECIFIED IRON DEFICIENCY ANEMIA TYPE: ICD-10-CM

## 2019-02-08 ENCOUNTER — COMMUNICATION - HEALTHEAST (OUTPATIENT)
Dept: FAMILY MEDICINE | Facility: CLINIC | Age: 39
End: 2019-02-08

## 2019-02-11 ENCOUNTER — COMMUNICATION - HEALTHEAST (OUTPATIENT)
Dept: FAMILY MEDICINE | Facility: CLINIC | Age: 39
End: 2019-02-11

## 2019-02-11 DIAGNOSIS — K06.9 DISEASE OF GINGIVA DUE TO LUPUS ERYTHEMATOSUS (H): ICD-10-CM

## 2019-02-11 DIAGNOSIS — M32.9 DISEASE OF GINGIVA DUE TO LUPUS ERYTHEMATOSUS (H): ICD-10-CM

## 2019-02-12 ENCOUNTER — COMMUNICATION - HEALTHEAST (OUTPATIENT)
Dept: FAMILY MEDICINE | Facility: CLINIC | Age: 39
End: 2019-02-12

## 2019-02-12 DIAGNOSIS — M32.9 DISEASE OF GINGIVA DUE TO LUPUS ERYTHEMATOSUS (H): ICD-10-CM

## 2019-02-12 DIAGNOSIS — K12.0 ULCER APHTHOUS ORAL: ICD-10-CM

## 2019-02-12 DIAGNOSIS — K06.9 DISEASE OF GINGIVA DUE TO LUPUS ERYTHEMATOSUS (H): ICD-10-CM

## 2019-02-13 ENCOUNTER — COMMUNICATION - HEALTHEAST (OUTPATIENT)
Dept: FAMILY MEDICINE | Facility: CLINIC | Age: 39
End: 2019-02-13

## 2019-02-13 DIAGNOSIS — K06.9 DISEASE OF GINGIVA DUE TO LUPUS ERYTHEMATOSUS (H): ICD-10-CM

## 2019-02-13 DIAGNOSIS — K12.0 ULCER APHTHOUS ORAL: ICD-10-CM

## 2019-02-13 DIAGNOSIS — M32.9 DISEASE OF GINGIVA DUE TO LUPUS ERYTHEMATOSUS (H): ICD-10-CM

## 2019-03-11 ENCOUNTER — COMMUNICATION - HEALTHEAST (OUTPATIENT)
Dept: FAMILY MEDICINE | Facility: CLINIC | Age: 39
End: 2019-03-11

## 2019-03-11 DIAGNOSIS — K12.0 ULCER APHTHOUS ORAL: ICD-10-CM

## 2019-03-11 DIAGNOSIS — M32.9 DISEASE OF GINGIVA DUE TO LUPUS ERYTHEMATOSUS (H): ICD-10-CM

## 2019-03-11 DIAGNOSIS — K06.9 DISEASE OF GINGIVA DUE TO LUPUS ERYTHEMATOSUS (H): ICD-10-CM

## 2019-03-22 ENCOUNTER — COMMUNICATION - HEALTHEAST (OUTPATIENT)
Dept: FAMILY MEDICINE | Facility: CLINIC | Age: 39
End: 2019-03-22

## 2019-03-22 DIAGNOSIS — M32.9 DISEASE OF GINGIVA DUE TO LUPUS ERYTHEMATOSUS (H): ICD-10-CM

## 2019-03-22 DIAGNOSIS — K06.9 DISEASE OF GINGIVA DUE TO LUPUS ERYTHEMATOSUS (H): ICD-10-CM

## 2019-03-25 ENCOUNTER — RECORDS - HEALTHEAST (OUTPATIENT)
Dept: FAMILY MEDICINE | Facility: CLINIC | Age: 39
End: 2019-03-25

## 2019-03-25 ENCOUNTER — COMMUNICATION - HEALTHEAST (OUTPATIENT)
Dept: FAMILY MEDICINE | Facility: CLINIC | Age: 39
End: 2019-03-25

## 2019-03-25 DIAGNOSIS — K06.9 DISEASE OF GINGIVA DUE TO LUPUS ERYTHEMATOSUS (H): ICD-10-CM

## 2019-03-25 DIAGNOSIS — K12.0 ULCER APHTHOUS ORAL: ICD-10-CM

## 2019-03-25 DIAGNOSIS — M32.9 DISEASE OF GINGIVA DUE TO LUPUS ERYTHEMATOSUS (H): ICD-10-CM

## 2019-03-28 ENCOUNTER — RECORDS - HEALTHEAST (OUTPATIENT)
Dept: LAB | Facility: HOSPITAL | Age: 39
End: 2019-03-28

## 2019-03-29 LAB
MEV IGG SER IA-ACNC: POSITIVE
MUV IGG SER QL IA: POSITIVE
RUBV IGG SERPL QL IA: POSITIVE
VZV IGG SER QL IA: POSITIVE

## 2019-04-01 LAB
GAMMA INTERFERON BACKGROUND BLD IA-ACNC: 0.03 IU/ML
M TB IFN-G BLD-IMP: NEGATIVE
MITOGEN IGNF BCKGRD COR BLD-ACNC: 0 IU/ML
MITOGEN IGNF BCKGRD COR BLD-ACNC: 0 IU/ML
QTF INTERPRETATION: NORMAL
QTF MITOGEN - NIL: 8.58 IU/ML

## 2019-04-15 ENCOUNTER — COMMUNICATION - HEALTHEAST (OUTPATIENT)
Dept: FAMILY MEDICINE | Facility: CLINIC | Age: 39
End: 2019-04-15

## 2019-04-15 DIAGNOSIS — K06.9 DISEASE OF GINGIVA DUE TO LUPUS ERYTHEMATOSUS (H): ICD-10-CM

## 2019-04-15 DIAGNOSIS — M32.9 DISEASE OF GINGIVA DUE TO LUPUS ERYTHEMATOSUS (H): ICD-10-CM

## 2019-04-16 ENCOUNTER — COMMUNICATION - HEALTHEAST (OUTPATIENT)
Dept: FAMILY MEDICINE | Facility: CLINIC | Age: 39
End: 2019-04-16

## 2019-04-26 ENCOUNTER — OFFICE VISIT - HEALTHEAST (OUTPATIENT)
Dept: FAMILY MEDICINE | Facility: CLINIC | Age: 39
End: 2019-04-26

## 2019-04-26 DIAGNOSIS — K12.0 ULCER APHTHOUS ORAL: ICD-10-CM

## 2019-04-26 DIAGNOSIS — N39.0 URINARY TRACT INFECTION WITHOUT HEMATURIA, SITE UNSPECIFIED: ICD-10-CM

## 2019-04-26 DIAGNOSIS — D50.9 IRON DEFICIENCY ANEMIA, UNSPECIFIED IRON DEFICIENCY ANEMIA TYPE: ICD-10-CM

## 2019-04-26 DIAGNOSIS — M32.9 DISEASE OF GINGIVA DUE TO LUPUS ERYTHEMATOSUS (H): ICD-10-CM

## 2019-04-26 DIAGNOSIS — K06.9 DISEASE OF GINGIVA DUE TO LUPUS ERYTHEMATOSUS (H): ICD-10-CM

## 2019-04-26 LAB
ALBUMIN UR-MCNC: NEGATIVE MG/DL
APPEARANCE UR: CLEAR
BACTERIA #/AREA URNS HPF: ABNORMAL HPF
BILIRUB UR QL STRIP: NEGATIVE
COLOR UR AUTO: YELLOW
GLUCOSE UR STRIP-MCNC: NEGATIVE MG/DL
HGB BLD-MCNC: 10 G/DL (ref 12–16)
HGB UR QL STRIP: ABNORMAL
KETONES UR STRIP-MCNC: NEGATIVE MG/DL
LEUKOCYTE ESTERASE UR QL STRIP: ABNORMAL
MUCOUS THREADS #/AREA URNS LPF: ABNORMAL LPF
NITRATE UR QL: NEGATIVE
PH UR STRIP: 5.5 [PH] (ref 5–8)
RBC #/AREA URNS AUTO: ABNORMAL HPF
SP GR UR STRIP: 1.02 (ref 1–1.03)
SQUAMOUS #/AREA URNS AUTO: ABNORMAL LPF
UROBILINOGEN UR STRIP-ACNC: ABNORMAL
WBC #/AREA URNS AUTO: ABNORMAL HPF

## 2019-04-26 ASSESSMENT — MIFFLIN-ST. JEOR: SCORE: 1339.47

## 2019-04-28 ENCOUNTER — AMBULATORY - HEALTHEAST (OUTPATIENT)
Dept: FAMILY MEDICINE | Facility: CLINIC | Age: 39
End: 2019-04-28

## 2019-04-28 ENCOUNTER — COMMUNICATION - HEALTHEAST (OUTPATIENT)
Dept: FAMILY MEDICINE | Facility: CLINIC | Age: 39
End: 2019-04-28

## 2019-04-28 DIAGNOSIS — N39.0 URINARY TRACT INFECTION WITHOUT HEMATURIA, SITE UNSPECIFIED: ICD-10-CM

## 2019-04-28 LAB — BACTERIA SPEC CULT: ABNORMAL

## 2019-05-16 ENCOUNTER — COMMUNICATION - HEALTHEAST (OUTPATIENT)
Dept: FAMILY MEDICINE | Facility: CLINIC | Age: 39
End: 2019-05-16

## 2019-05-16 DIAGNOSIS — K12.0 ULCER APHTHOUS ORAL: ICD-10-CM

## 2019-05-16 DIAGNOSIS — M32.9 DISEASE OF GINGIVA DUE TO LUPUS ERYTHEMATOSUS (H): ICD-10-CM

## 2019-05-16 DIAGNOSIS — K06.9 DISEASE OF GINGIVA DUE TO LUPUS ERYTHEMATOSUS (H): ICD-10-CM

## 2019-06-07 ENCOUNTER — COMMUNICATION - HEALTHEAST (OUTPATIENT)
Dept: FAMILY MEDICINE | Facility: CLINIC | Age: 39
End: 2019-06-07

## 2019-06-07 DIAGNOSIS — M32.9 DISEASE OF GINGIVA DUE TO LUPUS ERYTHEMATOSUS (H): ICD-10-CM

## 2019-06-07 DIAGNOSIS — K06.9 DISEASE OF GINGIVA DUE TO LUPUS ERYTHEMATOSUS (H): ICD-10-CM

## 2019-06-25 ENCOUNTER — COMMUNICATION - HEALTHEAST (OUTPATIENT)
Dept: FAMILY MEDICINE | Facility: CLINIC | Age: 39
End: 2019-06-25

## 2019-06-25 DIAGNOSIS — K12.0 ULCER APHTHOUS ORAL: ICD-10-CM

## 2019-06-25 DIAGNOSIS — K06.9 DISEASE OF GINGIVA DUE TO LUPUS ERYTHEMATOSUS (H): ICD-10-CM

## 2019-06-25 DIAGNOSIS — M32.9 DISEASE OF GINGIVA DUE TO LUPUS ERYTHEMATOSUS (H): ICD-10-CM

## 2019-06-26 ENCOUNTER — COMMUNICATION - HEALTHEAST (OUTPATIENT)
Dept: FAMILY MEDICINE | Facility: CLINIC | Age: 39
End: 2019-06-26

## 2019-06-26 DIAGNOSIS — B00.9 HERPES SIMPLEX VIRUS INFECTION: ICD-10-CM

## 2019-07-02 ENCOUNTER — COMMUNICATION - HEALTHEAST (OUTPATIENT)
Dept: FAMILY MEDICINE | Facility: CLINIC | Age: 39
End: 2019-07-02

## 2019-07-02 DIAGNOSIS — K06.9 DISEASE OF GINGIVA DUE TO LUPUS ERYTHEMATOSUS (H): ICD-10-CM

## 2019-07-02 DIAGNOSIS — M32.9 DISEASE OF GINGIVA DUE TO LUPUS ERYTHEMATOSUS (H): ICD-10-CM

## 2019-07-20 ENCOUNTER — COMMUNICATION - HEALTHEAST (OUTPATIENT)
Dept: FAMILY MEDICINE | Facility: CLINIC | Age: 39
End: 2019-07-20

## 2019-07-20 DIAGNOSIS — M32.9 DISEASE OF GINGIVA DUE TO LUPUS ERYTHEMATOSUS (H): ICD-10-CM

## 2019-07-20 DIAGNOSIS — K06.9 DISEASE OF GINGIVA DUE TO LUPUS ERYTHEMATOSUS (H): ICD-10-CM

## 2019-07-24 ENCOUNTER — COMMUNICATION - HEALTHEAST (OUTPATIENT)
Dept: FAMILY MEDICINE | Facility: CLINIC | Age: 39
End: 2019-07-24

## 2019-07-24 DIAGNOSIS — K06.9 DISEASE OF GINGIVA DUE TO LUPUS ERYTHEMATOSUS (H): ICD-10-CM

## 2019-07-24 DIAGNOSIS — M35.9 UNDIFFERENTIATED CONNECTIVE TISSUE DISEASE (H): ICD-10-CM

## 2019-07-24 DIAGNOSIS — M32.9 DISEASE OF GINGIVA DUE TO LUPUS ERYTHEMATOSUS (H): ICD-10-CM

## 2019-07-24 DIAGNOSIS — K12.0 ULCER APHTHOUS ORAL: ICD-10-CM

## 2019-07-25 ENCOUNTER — COMMUNICATION - HEALTHEAST (OUTPATIENT)
Dept: FAMILY MEDICINE | Facility: CLINIC | Age: 39
End: 2019-07-25

## 2019-07-29 ENCOUNTER — COMMUNICATION - HEALTHEAST (OUTPATIENT)
Dept: FAMILY MEDICINE | Facility: CLINIC | Age: 39
End: 2019-07-29

## 2019-08-06 ENCOUNTER — COMMUNICATION - HEALTHEAST (OUTPATIENT)
Dept: FAMILY MEDICINE | Facility: CLINIC | Age: 39
End: 2019-08-06

## 2019-08-06 DIAGNOSIS — K06.9 DISEASE OF GINGIVA DUE TO LUPUS ERYTHEMATOSUS (H): ICD-10-CM

## 2019-08-06 DIAGNOSIS — M32.9 DISEASE OF GINGIVA DUE TO LUPUS ERYTHEMATOSUS (H): ICD-10-CM

## 2019-08-07 ENCOUNTER — OFFICE VISIT - HEALTHEAST (OUTPATIENT)
Dept: FAMILY MEDICINE | Facility: CLINIC | Age: 39
End: 2019-08-07

## 2019-08-07 DIAGNOSIS — D50.9 IRON DEFICIENCY ANEMIA, UNSPECIFIED IRON DEFICIENCY ANEMIA TYPE: ICD-10-CM

## 2019-08-07 DIAGNOSIS — M32.9 DISEASE OF GINGIVA DUE TO LUPUS ERYTHEMATOSUS (H): ICD-10-CM

## 2019-08-07 DIAGNOSIS — K06.9 DISEASE OF GINGIVA DUE TO LUPUS ERYTHEMATOSUS (H): ICD-10-CM

## 2019-08-07 DIAGNOSIS — N92.0 MENORRHAGIA WITH REGULAR CYCLE: ICD-10-CM

## 2019-08-07 LAB
ERYTHROCYTE [DISTWIDTH] IN BLOOD BY AUTOMATED COUNT: 15.8 % (ref 11–14.5)
FERRITIN SERPL-MCNC: 22 NG/ML (ref 10–130)
FOLATE SERPL-MCNC: 11.4 NG/ML
HCT VFR BLD AUTO: 29.4 % (ref 35–47)
HGB BLD-MCNC: 9.4 G/DL (ref 12–16)
IRON SATN MFR SERPL: 3 % (ref 20–50)
IRON SERPL-MCNC: 11 UG/DL (ref 42–175)
MCH RBC QN AUTO: 27.4 PG (ref 27–34)
MCHC RBC AUTO-ENTMCNC: 32 G/DL (ref 32–36)
MCV RBC AUTO: 85 FL (ref 80–100)
PLATELET # BLD AUTO: 557 THOU/UL (ref 140–440)
PMV BLD AUTO: 6.5 FL (ref 7–10)
RBC # BLD AUTO: 3.44 MILL/UL (ref 3.8–5.4)
TIBC SERPL-MCNC: 318 UG/DL (ref 313–563)
TRANSFERRIN SERPL-MCNC: 254 MG/DL (ref 212–360)
VIT B12 SERPL-MCNC: 475 PG/ML (ref 213–816)
WBC: 6.8 THOU/UL (ref 4–11)

## 2019-08-07 ASSESSMENT — MIFFLIN-ST. JEOR: SCORE: 1324.95

## 2019-08-10 LAB
VIT B2 SERPL-MCNC: 9 MCG/L (ref 1–19)
ZINC SERPL-MCNC: 76.6 UG/DL (ref 60–120)

## 2019-08-11 LAB — PYRIDOXAL PHOS SERPL-SCNC: 18.9 NMOL/L (ref 20–125)

## 2019-08-14 ENCOUNTER — COMMUNICATION - HEALTHEAST (OUTPATIENT)
Dept: FAMILY MEDICINE | Facility: CLINIC | Age: 39
End: 2019-08-14

## 2019-08-14 DIAGNOSIS — Z11.1 SCREENING EXAMINATION FOR PULMONARY TUBERCULOSIS: ICD-10-CM

## 2019-08-19 ENCOUNTER — AMBULATORY - HEALTHEAST (OUTPATIENT)
Dept: LAB | Facility: CLINIC | Age: 39
End: 2019-08-19

## 2019-08-19 DIAGNOSIS — Z11.1 SCREENING EXAMINATION FOR PULMONARY TUBERCULOSIS: ICD-10-CM

## 2019-08-21 LAB
GAMMA INTERFERON BACKGROUND BLD IA-ACNC: 0.02 IU/ML
M TB IFN-G BLD-IMP: NEGATIVE
MITOGEN IGNF BCKGRD COR BLD-ACNC: 0.01 IU/ML
MITOGEN IGNF BCKGRD COR BLD-ACNC: 0.02 IU/ML
NIACIN SERPL-MCNC: 2.9 UG/ML
QTF INTERPRETATION: NORMAL
QTF MITOGEN - NIL: 6.6 IU/ML

## 2019-08-22 ENCOUNTER — COMMUNICATION - HEALTHEAST (OUTPATIENT)
Dept: FAMILY MEDICINE | Facility: CLINIC | Age: 39
End: 2019-08-22

## 2019-08-26 ENCOUNTER — RECORDS - HEALTHEAST (OUTPATIENT)
Dept: ADMINISTRATIVE | Facility: OTHER | Age: 39
End: 2019-08-26

## 2019-08-27 ENCOUNTER — COMMUNICATION - HEALTHEAST (OUTPATIENT)
Dept: FAMILY MEDICINE | Facility: CLINIC | Age: 39
End: 2019-08-27

## 2019-08-29 ENCOUNTER — OFFICE VISIT - HEALTHEAST (OUTPATIENT)
Dept: FAMILY MEDICINE | Facility: CLINIC | Age: 39
End: 2019-08-29

## 2019-08-29 DIAGNOSIS — M32.9 DISEASE OF GINGIVA DUE TO LUPUS ERYTHEMATOSUS (H): ICD-10-CM

## 2019-08-29 DIAGNOSIS — N89.8 VAGINAL DISCHARGE: ICD-10-CM

## 2019-08-29 DIAGNOSIS — K06.9 DISEASE OF GINGIVA DUE TO LUPUS ERYTHEMATOSUS (H): ICD-10-CM

## 2019-08-29 DIAGNOSIS — B00.9 HERPES SIMPLEX VIRUS (HSV) INFECTION: ICD-10-CM

## 2019-08-29 LAB
CLUE CELLS: NORMAL
TRICHOMONAS, WET PREP: NORMAL
YEAST, WET PREP: NORMAL

## 2019-08-30 LAB
C TRACH DNA SPEC QL PROBE+SIG AMP: NEGATIVE
N GONORRHOEA DNA SPEC QL NAA+PROBE: NEGATIVE
VARICELLA ZOSTER DNA COMMENT: NORMAL
VZV DNA SPEC QL NAA+PROBE: NORMAL
VZV PCR SPECIMEN: NORMAL

## 2019-09-06 ENCOUNTER — COMMUNICATION - HEALTHEAST (OUTPATIENT)
Dept: FAMILY MEDICINE | Facility: CLINIC | Age: 39
End: 2019-09-06

## 2019-09-06 ENCOUNTER — COMMUNICATION - HEALTHEAST (OUTPATIENT)
Dept: SCHEDULING | Facility: CLINIC | Age: 39
End: 2019-09-06

## 2019-09-06 DIAGNOSIS — M32.9 DISEASE OF GINGIVA DUE TO LUPUS ERYTHEMATOSUS (H): ICD-10-CM

## 2019-09-06 DIAGNOSIS — K06.9 DISEASE OF GINGIVA DUE TO LUPUS ERYTHEMATOSUS (H): ICD-10-CM

## 2019-09-30 ENCOUNTER — COMMUNICATION - HEALTHEAST (OUTPATIENT)
Dept: FAMILY MEDICINE | Facility: CLINIC | Age: 39
End: 2019-09-30

## 2019-09-30 DIAGNOSIS — K06.9 DISEASE OF GINGIVA DUE TO LUPUS ERYTHEMATOSUS (H): ICD-10-CM

## 2019-09-30 DIAGNOSIS — M32.9 DISEASE OF GINGIVA DUE TO LUPUS ERYTHEMATOSUS (H): ICD-10-CM

## 2019-10-01 ENCOUNTER — COMMUNICATION - HEALTHEAST (OUTPATIENT)
Dept: FAMILY MEDICINE | Facility: CLINIC | Age: 39
End: 2019-10-01

## 2019-10-07 ENCOUNTER — COMMUNICATION - HEALTHEAST (OUTPATIENT)
Dept: FAMILY MEDICINE | Facility: CLINIC | Age: 39
End: 2019-10-07

## 2019-10-07 DIAGNOSIS — M32.9 DISEASE OF GINGIVA DUE TO LUPUS ERYTHEMATOSUS (H): ICD-10-CM

## 2019-10-07 DIAGNOSIS — K06.9 DISEASE OF GINGIVA DUE TO LUPUS ERYTHEMATOSUS (H): ICD-10-CM

## 2019-10-24 ENCOUNTER — COMMUNICATION - HEALTHEAST (OUTPATIENT)
Dept: SCHEDULING | Facility: CLINIC | Age: 39
End: 2019-10-24

## 2019-10-24 DIAGNOSIS — K06.9 DISEASE OF GINGIVA DUE TO LUPUS ERYTHEMATOSUS (H): ICD-10-CM

## 2019-10-24 DIAGNOSIS — M32.9 DISEASE OF GINGIVA DUE TO LUPUS ERYTHEMATOSUS (H): ICD-10-CM

## 2019-10-28 ENCOUNTER — COMMUNICATION - HEALTHEAST (OUTPATIENT)
Dept: FAMILY MEDICINE | Facility: CLINIC | Age: 39
End: 2019-10-28

## 2019-10-28 DIAGNOSIS — M32.9 DISEASE OF GINGIVA DUE TO LUPUS ERYTHEMATOSUS (H): ICD-10-CM

## 2019-10-28 DIAGNOSIS — K06.9 DISEASE OF GINGIVA DUE TO LUPUS ERYTHEMATOSUS (H): ICD-10-CM

## 2019-11-07 ENCOUNTER — COMMUNICATION - HEALTHEAST (OUTPATIENT)
Dept: FAMILY MEDICINE | Facility: CLINIC | Age: 39
End: 2019-11-07

## 2019-11-07 DIAGNOSIS — K06.9 DISEASE OF GINGIVA DUE TO LUPUS ERYTHEMATOSUS (H): ICD-10-CM

## 2019-11-07 DIAGNOSIS — M32.9 DISEASE OF GINGIVA DUE TO LUPUS ERYTHEMATOSUS (H): ICD-10-CM

## 2019-12-03 ENCOUNTER — COMMUNICATION - HEALTHEAST (OUTPATIENT)
Dept: FAMILY MEDICINE | Facility: CLINIC | Age: 39
End: 2019-12-03

## 2019-12-03 DIAGNOSIS — K06.9 DISEASE OF GINGIVA DUE TO LUPUS ERYTHEMATOSUS (H): ICD-10-CM

## 2019-12-03 DIAGNOSIS — M32.9 DISEASE OF GINGIVA DUE TO LUPUS ERYTHEMATOSUS (H): ICD-10-CM

## 2019-12-06 ENCOUNTER — COMMUNICATION - HEALTHEAST (OUTPATIENT)
Dept: FAMILY MEDICINE | Facility: CLINIC | Age: 39
End: 2019-12-06

## 2019-12-09 ENCOUNTER — NURSE TRIAGE (OUTPATIENT)
Dept: NURSING | Facility: CLINIC | Age: 39
End: 2019-12-09

## 2019-12-10 ENCOUNTER — COMMUNICATION - HEALTHEAST (OUTPATIENT)
Dept: SCHEDULING | Facility: CLINIC | Age: 39
End: 2019-12-10

## 2019-12-10 DIAGNOSIS — M32.9 DISEASE OF GINGIVA DUE TO LUPUS ERYTHEMATOSUS (H): ICD-10-CM

## 2019-12-10 DIAGNOSIS — K06.9 DISEASE OF GINGIVA DUE TO LUPUS ERYTHEMATOSUS (H): ICD-10-CM

## 2019-12-10 NOTE — TELEPHONE ENCOUNTER
Suad is calling about pain medication.    She has an office appointment scheduled but could not get an appointment until Jan.    She is a [facility] patient.  Offered to transfer her to [facility] nurse line.    Suad states that she will call back in the morning.    Lisa Orr RN  [facility]      Reason for Disposition    Caller requesting a NON-URGENT new prescription or refill and triager unable to refill per unit policy    Protocols used: MEDICATION QUESTION CALL-A-AH

## 2019-12-17 ENCOUNTER — COMMUNICATION - HEALTHEAST (OUTPATIENT)
Dept: FAMILY MEDICINE | Facility: CLINIC | Age: 39
End: 2019-12-17

## 2019-12-17 ENCOUNTER — OFFICE VISIT - HEALTHEAST (OUTPATIENT)
Dept: RHEUMATOLOGY | Facility: CLINIC | Age: 39
End: 2019-12-17

## 2019-12-17 DIAGNOSIS — M70.62 GREATER TROCHANTERIC BURSITIS OF LEFT HIP: ICD-10-CM

## 2019-12-17 DIAGNOSIS — B00.9 HERPES SIMPLEX VIRUS (HSV) INFECTION: ICD-10-CM

## 2019-12-17 DIAGNOSIS — K06.9 DISEASE OF GINGIVA DUE TO LUPUS ERYTHEMATOSUS (H): ICD-10-CM

## 2019-12-17 DIAGNOSIS — M54.42 CHRONIC BILATERAL LOW BACK PAIN WITH LEFT-SIDED SCIATICA: ICD-10-CM

## 2019-12-17 DIAGNOSIS — M32.9 DISEASE OF GINGIVA DUE TO LUPUS ERYTHEMATOSUS (H): ICD-10-CM

## 2019-12-17 DIAGNOSIS — G89.29 CHRONIC BILATERAL LOW BACK PAIN WITH LEFT-SIDED SCIATICA: ICD-10-CM

## 2019-12-17 ASSESSMENT — MIFFLIN-ST. JEOR: SCORE: 1331.3

## 2020-01-02 ENCOUNTER — COMMUNICATION - HEALTHEAST (OUTPATIENT)
Dept: FAMILY MEDICINE | Facility: CLINIC | Age: 40
End: 2020-01-02

## 2020-01-02 ENCOUNTER — COMMUNICATION - HEALTHEAST (OUTPATIENT)
Dept: SCHEDULING | Facility: CLINIC | Age: 40
End: 2020-01-02

## 2020-01-02 DIAGNOSIS — M32.9 DISEASE OF GINGIVA DUE TO LUPUS ERYTHEMATOSUS (H): ICD-10-CM

## 2020-01-02 DIAGNOSIS — K06.9 DISEASE OF GINGIVA DUE TO LUPUS ERYTHEMATOSUS (H): ICD-10-CM

## 2020-01-14 ENCOUNTER — COMMUNICATION - HEALTHEAST (OUTPATIENT)
Dept: FAMILY MEDICINE | Facility: CLINIC | Age: 40
End: 2020-01-14

## 2020-01-20 ENCOUNTER — OFFICE VISIT - HEALTHEAST (OUTPATIENT)
Dept: FAMILY MEDICINE | Facility: CLINIC | Age: 40
End: 2020-01-20

## 2020-01-20 DIAGNOSIS — K06.9 DISEASE OF GINGIVA DUE TO LUPUS ERYTHEMATOSUS (H): ICD-10-CM

## 2020-01-20 DIAGNOSIS — F11.90 CHRONIC, CONTINUOUS USE OF OPIOIDS: ICD-10-CM

## 2020-01-20 DIAGNOSIS — M32.9 DISEASE OF GINGIVA DUE TO LUPUS ERYTHEMATOSUS (H): ICD-10-CM

## 2020-01-20 DIAGNOSIS — K12.0 ORAL APHTHAE: ICD-10-CM

## 2020-01-20 LAB
ALBUMIN SERPL-MCNC: 4 G/DL (ref 3.5–5)
ALBUMIN UR-MCNC: NEGATIVE MG/DL
ALT SERPL W P-5'-P-CCNC: 13 U/L (ref 0–45)
APPEARANCE UR: CLEAR
AST SERPL W P-5'-P-CCNC: 15 U/L (ref 0–40)
BACTERIA #/AREA URNS HPF: ABNORMAL HPF
BILIRUB UR QL STRIP: NEGATIVE
C REACTIVE PROTEIN LHE: 0.2 MG/DL (ref 0–0.8)
C3 SERPL-MCNC: 158 MG/DL (ref 83–177)
C4 SERPL-MCNC: 20 MG/DL (ref 19–59)
COLOR UR AUTO: YELLOW
CREAT SERPL-MCNC: 0.57 MG/DL (ref 0.6–1.1)
CREAT UR-MCNC: 87 MG/DL
ERYTHROCYTE [DISTWIDTH] IN BLOOD BY AUTOMATED COUNT: 15.8 % (ref 11–14.5)
ERYTHROCYTE [SEDIMENTATION RATE] IN BLOOD BY WESTERGREN METHOD: 40 MM/HR (ref 0–20)
GFR SERPL CREATININE-BSD FRML MDRD: >60 ML/MIN/1.73M2
GLUCOSE UR STRIP-MCNC: NEGATIVE MG/DL
HCT VFR BLD AUTO: 30.9 % (ref 35–47)
HGB BLD-MCNC: 9.7 G/DL (ref 12–16)
HGB UR QL STRIP: ABNORMAL
KETONES UR STRIP-MCNC: NEGATIVE MG/DL
LEUKOCYTE ESTERASE UR QL STRIP: ABNORMAL
MCH RBC QN AUTO: 25.5 PG (ref 27–34)
MCHC RBC AUTO-ENTMCNC: 31.3 G/DL (ref 32–36)
MCV RBC AUTO: 81 FL (ref 80–100)
MICROALBUMIN UR-MCNC: 1.11 MG/DL (ref 0–1.99)
MICROALBUMIN/CREAT UR: 12.8 MG/G
MUCOUS THREADS #/AREA URNS LPF: ABNORMAL LPF
NITRATE UR QL: NEGATIVE
PH UR STRIP: 5.5 [PH] (ref 5–8)
PLATELET # BLD AUTO: 602 THOU/UL (ref 140–440)
PMV BLD AUTO: 6.7 FL (ref 7–10)
RBC # BLD AUTO: 3.8 MILL/UL (ref 3.8–5.4)
RBC #/AREA URNS AUTO: ABNORMAL HPF
SP GR UR STRIP: 1.02 (ref 1–1.03)
SQUAMOUS #/AREA URNS AUTO: ABNORMAL LPF
UROBILINOGEN UR STRIP-ACNC: ABNORMAL
WBC #/AREA URNS AUTO: ABNORMAL HPF
WBC: 5.1 THOU/UL (ref 4–11)

## 2020-01-20 ASSESSMENT — MIFFLIN-ST. JEOR: SCORE: 1330.39

## 2020-01-20 ASSESSMENT — PATIENT HEALTH QUESTIONNAIRE - PHQ9: SUM OF ALL RESPONSES TO PHQ QUESTIONS 1-9: 1

## 2020-01-21 LAB
BACTERIA SPEC CULT: NO GROWTH
DNA (DS) ANTIBODY - HISTORICAL: 2 IU

## 2020-01-22 ENCOUNTER — COMMUNICATION - HEALTHEAST (OUTPATIENT)
Dept: FAMILY MEDICINE | Facility: CLINIC | Age: 40
End: 2020-01-22

## 2020-01-22 ENCOUNTER — COMMUNICATION - HEALTHEAST (OUTPATIENT)
Dept: RHEUMATOLOGY | Facility: CLINIC | Age: 40
End: 2020-01-22

## 2020-01-22 DIAGNOSIS — K06.9 DISEASE OF GINGIVA DUE TO LUPUS ERYTHEMATOSUS (H): ICD-10-CM

## 2020-01-22 DIAGNOSIS — M32.9 DISEASE OF GINGIVA DUE TO LUPUS ERYTHEMATOSUS (H): ICD-10-CM

## 2020-01-22 DIAGNOSIS — Z79.899 HIGH RISK MEDICATION USE: ICD-10-CM

## 2020-01-23 ENCOUNTER — COMMUNICATION - HEALTHEAST (OUTPATIENT)
Dept: FAMILY MEDICINE | Facility: CLINIC | Age: 40
End: 2020-01-23

## 2020-01-31 ENCOUNTER — COMMUNICATION - HEALTHEAST (OUTPATIENT)
Dept: SCHEDULING | Facility: CLINIC | Age: 40
End: 2020-01-31

## 2020-01-31 ENCOUNTER — COMMUNICATION - HEALTHEAST (OUTPATIENT)
Dept: FAMILY MEDICINE | Facility: CLINIC | Age: 40
End: 2020-01-31

## 2020-01-31 DIAGNOSIS — M32.9 DISEASE OF GINGIVA DUE TO LUPUS ERYTHEMATOSUS (H): ICD-10-CM

## 2020-01-31 DIAGNOSIS — K06.9 DISEASE OF GINGIVA DUE TO LUPUS ERYTHEMATOSUS (H): ICD-10-CM

## 2020-02-03 ENCOUNTER — RECORDS - HEALTHEAST (OUTPATIENT)
Dept: ADMINISTRATIVE | Facility: OTHER | Age: 40
End: 2020-02-03

## 2020-02-19 ENCOUNTER — COMMUNICATION - HEALTHEAST (OUTPATIENT)
Dept: FAMILY MEDICINE | Facility: CLINIC | Age: 40
End: 2020-02-19

## 2020-02-19 DIAGNOSIS — M32.9 DISEASE OF GINGIVA DUE TO LUPUS ERYTHEMATOSUS (H): ICD-10-CM

## 2020-02-19 DIAGNOSIS — K06.9 DISEASE OF GINGIVA DUE TO LUPUS ERYTHEMATOSUS (H): ICD-10-CM

## 2020-03-16 ENCOUNTER — COMMUNICATION - HEALTHEAST (OUTPATIENT)
Dept: FAMILY MEDICINE | Facility: CLINIC | Age: 40
End: 2020-03-16

## 2020-03-16 DIAGNOSIS — K06.9 DISEASE OF GINGIVA DUE TO LUPUS ERYTHEMATOSUS (H): ICD-10-CM

## 2020-03-16 DIAGNOSIS — M32.9 DISEASE OF GINGIVA DUE TO LUPUS ERYTHEMATOSUS (H): ICD-10-CM

## 2020-03-19 ENCOUNTER — COMMUNICATION - HEALTHEAST (OUTPATIENT)
Dept: FAMILY MEDICINE | Facility: CLINIC | Age: 40
End: 2020-03-19

## 2020-03-19 DIAGNOSIS — K06.9 DISEASE OF GINGIVA DUE TO LUPUS ERYTHEMATOSUS (H): ICD-10-CM

## 2020-03-19 DIAGNOSIS — M32.9 DISEASE OF GINGIVA DUE TO LUPUS ERYTHEMATOSUS (H): ICD-10-CM

## 2020-03-20 ENCOUNTER — COMMUNICATION - HEALTHEAST (OUTPATIENT)
Dept: FAMILY MEDICINE | Facility: CLINIC | Age: 40
End: 2020-03-20

## 2020-03-20 DIAGNOSIS — G47.00 INSOMNIA, UNSPECIFIED TYPE: ICD-10-CM

## 2020-03-30 ENCOUNTER — COMMUNICATION - HEALTHEAST (OUTPATIENT)
Dept: FAMILY MEDICINE | Facility: CLINIC | Age: 40
End: 2020-03-30

## 2020-04-01 ENCOUNTER — COMMUNICATION - HEALTHEAST (OUTPATIENT)
Dept: FAMILY MEDICINE | Facility: CLINIC | Age: 40
End: 2020-04-01

## 2020-04-09 ENCOUNTER — COMMUNICATION - HEALTHEAST (OUTPATIENT)
Dept: FAMILY MEDICINE | Facility: CLINIC | Age: 40
End: 2020-04-09

## 2020-04-15 ENCOUNTER — COMMUNICATION - HEALTHEAST (OUTPATIENT)
Dept: FAMILY MEDICINE | Facility: CLINIC | Age: 40
End: 2020-04-15

## 2020-04-15 DIAGNOSIS — M32.9 DISEASE OF GINGIVA DUE TO LUPUS ERYTHEMATOSUS (H): ICD-10-CM

## 2020-04-15 DIAGNOSIS — K06.9 DISEASE OF GINGIVA DUE TO LUPUS ERYTHEMATOSUS (H): ICD-10-CM

## 2020-04-16 ENCOUNTER — OFFICE VISIT - HEALTHEAST (OUTPATIENT)
Dept: FAMILY MEDICINE | Facility: CLINIC | Age: 40
End: 2020-04-16

## 2020-04-16 DIAGNOSIS — K12.0 ORAL APHTHAE: ICD-10-CM

## 2020-04-16 DIAGNOSIS — M35.9 UNDIFFERENTIATED CONNECTIVE TISSUE DISEASE (H): ICD-10-CM

## 2020-04-16 DIAGNOSIS — H53.9 VISUAL CHANGES: ICD-10-CM

## 2020-04-20 ENCOUNTER — COMMUNICATION - HEALTHEAST (OUTPATIENT)
Dept: FAMILY MEDICINE | Facility: CLINIC | Age: 40
End: 2020-04-20

## 2020-04-20 DIAGNOSIS — K06.9 DISEASE OF GINGIVA DUE TO LUPUS ERYTHEMATOSUS (H): ICD-10-CM

## 2020-04-20 DIAGNOSIS — M32.9 DISEASE OF GINGIVA DUE TO LUPUS ERYTHEMATOSUS (H): ICD-10-CM

## 2020-05-30 ENCOUNTER — COMMUNICATION - HEALTHEAST (OUTPATIENT)
Dept: FAMILY MEDICINE | Facility: CLINIC | Age: 40
End: 2020-05-30

## 2020-05-30 DIAGNOSIS — K06.9 DISEASE OF GINGIVA DUE TO LUPUS ERYTHEMATOSUS (H): ICD-10-CM

## 2020-05-30 DIAGNOSIS — M32.9 DISEASE OF GINGIVA DUE TO LUPUS ERYTHEMATOSUS (H): ICD-10-CM

## 2020-06-02 ENCOUNTER — COMMUNICATION - HEALTHEAST (OUTPATIENT)
Dept: FAMILY MEDICINE | Facility: CLINIC | Age: 40
End: 2020-06-02

## 2020-06-02 DIAGNOSIS — K06.9 DISEASE OF GINGIVA DUE TO LUPUS ERYTHEMATOSUS (H): ICD-10-CM

## 2020-06-02 DIAGNOSIS — M32.9 DISEASE OF GINGIVA DUE TO LUPUS ERYTHEMATOSUS (H): ICD-10-CM

## 2020-07-02 ENCOUNTER — COMMUNICATION - HEALTHEAST (OUTPATIENT)
Dept: RHEUMATOLOGY | Facility: CLINIC | Age: 40
End: 2020-07-02

## 2020-07-06 ENCOUNTER — COMMUNICATION - HEALTHEAST (OUTPATIENT)
Dept: FAMILY MEDICINE | Facility: CLINIC | Age: 40
End: 2020-07-06

## 2020-07-06 DIAGNOSIS — K06.9 DISEASE OF GINGIVA DUE TO LUPUS ERYTHEMATOSUS (H): ICD-10-CM

## 2020-07-06 DIAGNOSIS — M32.9 DISEASE OF GINGIVA DUE TO LUPUS ERYTHEMATOSUS (H): ICD-10-CM

## 2020-07-24 ENCOUNTER — OFFICE VISIT - HEALTHEAST (OUTPATIENT)
Dept: FAMILY MEDICINE | Facility: CLINIC | Age: 40
End: 2020-07-24

## 2020-07-24 DIAGNOSIS — M32.9 DISEASE OF GINGIVA DUE TO LUPUS ERYTHEMATOSUS (H): ICD-10-CM

## 2020-07-24 DIAGNOSIS — K06.9 DISEASE OF GINGIVA DUE TO LUPUS ERYTHEMATOSUS (H): ICD-10-CM

## 2020-07-24 DIAGNOSIS — D50.0 IRON DEFICIENCY ANEMIA DUE TO CHRONIC BLOOD LOSS: ICD-10-CM

## 2020-07-24 DIAGNOSIS — K12.0 ORAL APHTHAE: ICD-10-CM

## 2020-07-24 ASSESSMENT — PATIENT HEALTH QUESTIONNAIRE - PHQ9: SUM OF ALL RESPONSES TO PHQ QUESTIONS 1-9: 0

## 2020-07-28 ENCOUNTER — COMMUNICATION - HEALTHEAST (OUTPATIENT)
Dept: FAMILY MEDICINE | Facility: CLINIC | Age: 40
End: 2020-07-28

## 2020-07-28 DIAGNOSIS — K06.9 DISEASE OF GINGIVA DUE TO LUPUS ERYTHEMATOSUS (H): ICD-10-CM

## 2020-07-28 DIAGNOSIS — M32.9 DISEASE OF GINGIVA DUE TO LUPUS ERYTHEMATOSUS (H): ICD-10-CM

## 2020-07-29 ENCOUNTER — OFFICE VISIT - HEALTHEAST (OUTPATIENT)
Dept: RHEUMATOLOGY | Facility: CLINIC | Age: 40
End: 2020-07-29

## 2020-07-29 ENCOUNTER — COMMUNICATION - HEALTHEAST (OUTPATIENT)
Dept: FAMILY MEDICINE | Facility: CLINIC | Age: 40
End: 2020-07-29

## 2020-07-29 DIAGNOSIS — B00.9 HERPES SIMPLEX VIRUS INFECTION: ICD-10-CM

## 2020-07-31 ENCOUNTER — OFFICE VISIT - HEALTHEAST (OUTPATIENT)
Dept: RHEUMATOLOGY | Facility: CLINIC | Age: 40
End: 2020-07-31

## 2020-07-31 ENCOUNTER — COMMUNICATION - HEALTHEAST (OUTPATIENT)
Dept: RHEUMATOLOGY | Facility: CLINIC | Age: 40
End: 2020-07-31

## 2020-07-31 DIAGNOSIS — Z79.899 HIGH RISK MEDICATION USE: ICD-10-CM

## 2020-07-31 DIAGNOSIS — M32.9 DISEASE OF GINGIVA DUE TO LUPUS ERYTHEMATOSUS (H): ICD-10-CM

## 2020-07-31 DIAGNOSIS — R31.29 MICROSCOPIC HEMATURIA: ICD-10-CM

## 2020-07-31 DIAGNOSIS — K06.9 DISEASE OF GINGIVA DUE TO LUPUS ERYTHEMATOSUS (H): ICD-10-CM

## 2020-07-31 DIAGNOSIS — L98.9 SKIN LESIONS: ICD-10-CM

## 2020-07-31 DIAGNOSIS — K12.0 APHTHOUS ULCER: ICD-10-CM

## 2020-08-04 ENCOUNTER — COMMUNICATION - HEALTHEAST (OUTPATIENT)
Dept: RHEUMATOLOGY | Facility: CLINIC | Age: 40
End: 2020-08-04

## 2020-08-04 ENCOUNTER — COMMUNICATION - HEALTHEAST (OUTPATIENT)
Dept: FAMILY MEDICINE | Facility: CLINIC | Age: 40
End: 2020-08-04

## 2020-08-04 DIAGNOSIS — K06.9 DISEASE OF GINGIVA DUE TO LUPUS ERYTHEMATOSUS (H): ICD-10-CM

## 2020-08-04 DIAGNOSIS — M32.9 DISEASE OF GINGIVA DUE TO LUPUS ERYTHEMATOSUS (H): ICD-10-CM

## 2020-09-08 ENCOUNTER — COMMUNICATION - HEALTHEAST (OUTPATIENT)
Dept: FAMILY MEDICINE | Facility: CLINIC | Age: 40
End: 2020-09-08

## 2020-09-08 DIAGNOSIS — K06.9 DISEASE OF GINGIVA DUE TO LUPUS ERYTHEMATOSUS (H): ICD-10-CM

## 2020-09-08 DIAGNOSIS — M32.9 DISEASE OF GINGIVA DUE TO LUPUS ERYTHEMATOSUS (H): ICD-10-CM

## 2020-09-16 ENCOUNTER — COMMUNICATION - HEALTHEAST (OUTPATIENT)
Dept: FAMILY MEDICINE | Facility: CLINIC | Age: 40
End: 2020-09-16

## 2020-10-05 ENCOUNTER — COMMUNICATION - HEALTHEAST (OUTPATIENT)
Dept: FAMILY MEDICINE | Facility: CLINIC | Age: 40
End: 2020-10-05

## 2020-10-05 DIAGNOSIS — M32.9 DISEASE OF GINGIVA DUE TO LUPUS ERYTHEMATOSUS (H): ICD-10-CM

## 2020-10-05 DIAGNOSIS — K06.9 DISEASE OF GINGIVA DUE TO LUPUS ERYTHEMATOSUS (H): ICD-10-CM

## 2020-10-07 ENCOUNTER — COMMUNICATION - HEALTHEAST (OUTPATIENT)
Dept: FAMILY MEDICINE | Facility: CLINIC | Age: 40
End: 2020-10-07

## 2020-10-07 DIAGNOSIS — M54.42 CHRONIC BILATERAL LOW BACK PAIN WITH LEFT-SIDED SCIATICA: ICD-10-CM

## 2020-10-07 DIAGNOSIS — G89.29 CHRONIC BILATERAL LOW BACK PAIN WITH LEFT-SIDED SCIATICA: ICD-10-CM

## 2020-10-07 DIAGNOSIS — M70.62 GREATER TROCHANTERIC BURSITIS OF LEFT HIP: ICD-10-CM

## 2020-10-08 ENCOUNTER — COMMUNICATION - HEALTHEAST (OUTPATIENT)
Dept: FAMILY MEDICINE | Facility: CLINIC | Age: 40
End: 2020-10-08

## 2020-10-08 DIAGNOSIS — M32.9 DISEASE OF GINGIVA DUE TO LUPUS ERYTHEMATOSUS (H): ICD-10-CM

## 2020-10-08 DIAGNOSIS — K06.9 DISEASE OF GINGIVA DUE TO LUPUS ERYTHEMATOSUS (H): ICD-10-CM

## 2020-10-09 RX ORDER — LIDOCAINE 50 MG/G
PATCH TOPICAL
Qty: 30 PATCH | Refills: 3 | Status: SHIPPED | OUTPATIENT
Start: 2020-10-09

## 2020-11-05 ENCOUNTER — COMMUNICATION - HEALTHEAST (OUTPATIENT)
Dept: FAMILY MEDICINE | Facility: CLINIC | Age: 40
End: 2020-11-05

## 2020-11-05 DIAGNOSIS — M32.9 DISEASE OF GINGIVA DUE TO LUPUS ERYTHEMATOSUS (H): ICD-10-CM

## 2020-11-05 DIAGNOSIS — K06.9 DISEASE OF GINGIVA DUE TO LUPUS ERYTHEMATOSUS (H): ICD-10-CM

## 2020-11-10 ENCOUNTER — COMMUNICATION - HEALTHEAST (OUTPATIENT)
Dept: FAMILY MEDICINE | Facility: CLINIC | Age: 40
End: 2020-11-10

## 2020-11-18 ENCOUNTER — OFFICE VISIT - HEALTHEAST (OUTPATIENT)
Dept: RHEUMATOLOGY | Facility: CLINIC | Age: 40
End: 2020-11-18

## 2020-12-08 ENCOUNTER — COMMUNICATION - HEALTHEAST (OUTPATIENT)
Dept: FAMILY MEDICINE | Facility: CLINIC | Age: 40
End: 2020-12-08

## 2020-12-08 DIAGNOSIS — K06.9 DISEASE OF GINGIVA DUE TO LUPUS ERYTHEMATOSUS (H): ICD-10-CM

## 2020-12-08 DIAGNOSIS — M32.9 DISEASE OF GINGIVA DUE TO LUPUS ERYTHEMATOSUS (H): ICD-10-CM

## 2021-01-04 ENCOUNTER — COMMUNICATION - HEALTHEAST (OUTPATIENT)
Dept: FAMILY MEDICINE | Facility: CLINIC | Age: 41
End: 2021-01-04

## 2021-01-04 DIAGNOSIS — G89.29 CHRONIC BILATERAL LOW BACK PAIN WITH LEFT-SIDED SCIATICA: ICD-10-CM

## 2021-01-04 DIAGNOSIS — M54.42 CHRONIC BILATERAL LOW BACK PAIN WITH LEFT-SIDED SCIATICA: ICD-10-CM

## 2021-01-04 DIAGNOSIS — M32.9 DISEASE OF GINGIVA DUE TO LUPUS ERYTHEMATOSUS (H): ICD-10-CM

## 2021-01-04 DIAGNOSIS — K06.9 DISEASE OF GINGIVA DUE TO LUPUS ERYTHEMATOSUS (H): ICD-10-CM

## 2021-01-04 DIAGNOSIS — M70.62 GREATER TROCHANTERIC BURSITIS OF LEFT HIP: ICD-10-CM

## 2021-01-26 ENCOUNTER — AMBULATORY - HEALTHEAST (OUTPATIENT)
Dept: LAB | Facility: CLINIC | Age: 41
End: 2021-01-26

## 2021-01-26 ENCOUNTER — COMMUNICATION - HEALTHEAST (OUTPATIENT)
Dept: LAB | Facility: CLINIC | Age: 41
End: 2021-01-26

## 2021-01-26 ENCOUNTER — OFFICE VISIT - HEALTHEAST (OUTPATIENT)
Dept: FAMILY MEDICINE | Facility: CLINIC | Age: 41
End: 2021-01-26

## 2021-01-26 DIAGNOSIS — R76.8 ELEVATED ANTINUCLEAR ANTIBODY (ANA) LEVEL: ICD-10-CM

## 2021-01-26 DIAGNOSIS — M35.9 UNDIFFERENTIATED CONNECTIVE TISSUE DISEASE (H): ICD-10-CM

## 2021-01-26 DIAGNOSIS — K06.9 DISEASE OF GINGIVA DUE TO LUPUS ERYTHEMATOSUS (H): ICD-10-CM

## 2021-01-26 DIAGNOSIS — R82.90 ABNORMAL URINALYSIS: ICD-10-CM

## 2021-01-26 DIAGNOSIS — M32.9 DISEASE OF GINGIVA DUE TO LUPUS ERYTHEMATOSUS (H): ICD-10-CM

## 2021-02-14 ENCOUNTER — COMMUNICATION - HEALTHEAST (OUTPATIENT)
Dept: FAMILY MEDICINE | Facility: CLINIC | Age: 41
End: 2021-02-14

## 2021-02-14 DIAGNOSIS — K06.9 DISEASE OF GINGIVA DUE TO LUPUS ERYTHEMATOSUS (H): ICD-10-CM

## 2021-02-14 DIAGNOSIS — M32.9 DISEASE OF GINGIVA DUE TO LUPUS ERYTHEMATOSUS (H): ICD-10-CM

## 2021-02-22 ENCOUNTER — COMMUNICATION - HEALTHEAST (OUTPATIENT)
Dept: FAMILY MEDICINE | Facility: CLINIC | Age: 41
End: 2021-02-22

## 2021-02-22 ENCOUNTER — COMMUNICATION - HEALTHEAST (OUTPATIENT)
Dept: ADMINISTRATIVE | Facility: CLINIC | Age: 41
End: 2021-02-22

## 2021-02-22 DIAGNOSIS — K06.9 DISEASE OF GINGIVA DUE TO LUPUS ERYTHEMATOSUS (H): ICD-10-CM

## 2021-02-22 DIAGNOSIS — M32.9 DISEASE OF GINGIVA DUE TO LUPUS ERYTHEMATOSUS (H): ICD-10-CM

## 2021-02-23 ENCOUNTER — AMBULATORY - HEALTHEAST (OUTPATIENT)
Dept: LAB | Facility: CLINIC | Age: 41
End: 2021-02-23

## 2021-02-23 ENCOUNTER — COMMUNICATION - HEALTHEAST (OUTPATIENT)
Dept: LAB | Facility: CLINIC | Age: 41
End: 2021-02-23

## 2021-02-23 ENCOUNTER — COMMUNICATION - HEALTHEAST (OUTPATIENT)
Dept: FAMILY MEDICINE | Facility: CLINIC | Age: 41
End: 2021-02-23

## 2021-02-23 DIAGNOSIS — D64.9 ANEMIA: ICD-10-CM

## 2021-02-23 DIAGNOSIS — R76.8 ELEVATED ANTINUCLEAR ANTIBODY (ANA) LEVEL: ICD-10-CM

## 2021-02-23 DIAGNOSIS — R82.90 ABNORMAL URINALYSIS: ICD-10-CM

## 2021-02-23 DIAGNOSIS — N92.4 EXCESSIVE BLEEDING IN PREMENOPAUSAL PERIOD: ICD-10-CM

## 2021-02-23 DIAGNOSIS — N30.00 ACUTE CYSTITIS WITHOUT HEMATURIA: ICD-10-CM

## 2021-02-23 DIAGNOSIS — D50.0 IRON DEFICIENCY ANEMIA DUE TO CHRONIC BLOOD LOSS: ICD-10-CM

## 2021-02-23 LAB
ALBUMIN SERPL-MCNC: 3.4 G/DL (ref 3.5–5)
ALBUMIN UR-MCNC: ABNORMAL MG/DL
ALT SERPL W P-5'-P-CCNC: <9 U/L (ref 0–45)
APPEARANCE UR: ABNORMAL
AST SERPL W P-5'-P-CCNC: 8 U/L (ref 0–40)
BILIRUB UR QL STRIP: NEGATIVE
C REACTIVE PROTEIN LHE: 1.2 MG/DL (ref 0–0.8)
C3 SERPL-MCNC: 122 MG/DL (ref 83–177)
C4 SERPL-MCNC: 16 MG/DL (ref 19–59)
COLOR UR AUTO: YELLOW
CREAT SERPL-MCNC: 0.62 MG/DL (ref 0.6–1.1)
CREAT UR-MCNC: 218.2 MG/DL
ERYTHROCYTE [DISTWIDTH] IN BLOOD BY AUTOMATED COUNT: 17.9 % (ref 11–14.5)
ERYTHROCYTE [SEDIMENTATION RATE] IN BLOOD BY WESTERGREN METHOD: 44 MM/HR (ref 0–20)
GFR SERPL CREATININE-BSD FRML MDRD: >60 ML/MIN/1.73M2
GLUCOSE UR STRIP-MCNC: NEGATIVE MG/DL
HCT VFR BLD AUTO: 25 % (ref 35–47)
HGB BLD-MCNC: 7.6 G/DL (ref 12–16)
HGB UR QL STRIP: ABNORMAL
KETONES UR STRIP-MCNC: NEGATIVE MG/DL
LEUKOCYTE ESTERASE UR QL STRIP: ABNORMAL
MCH RBC QN AUTO: 26.4 PG (ref 27–34)
MCHC RBC AUTO-ENTMCNC: 30.4 G/DL (ref 32–36)
MCV RBC AUTO: 87 FL (ref 80–100)
MICROALBUMIN UR-MCNC: 7.13 MG/DL (ref 0–1.99)
MICROALBUMIN/CREAT UR: 32.7 MG/G
NITRATE UR QL: POSITIVE
PH UR STRIP: 6.5 [PH] (ref 5–8)
PLATELET # BLD AUTO: 515 THOU/UL (ref 140–440)
PMV BLD AUTO: 9.1 FL (ref 7–10)
RBC # BLD AUTO: 2.88 MILL/UL (ref 3.8–5.4)
SP GR UR STRIP: 1.02 (ref 1–1.03)
UROBILINOGEN UR STRIP-ACNC: ABNORMAL
WBC: 6.6 THOU/UL (ref 4–11)

## 2021-02-24 RX ORDER — FERROUS SULFATE 325(65) MG
1 TABLET ORAL 2 TIMES DAILY
Qty: 60 TABLET | Refills: 3 | Status: SHIPPED | OUTPATIENT
Start: 2021-02-24 | End: 2022-09-01

## 2021-02-24 RX ORDER — HYDROXYCHLOROQUINE SULFATE 200 MG/1
TABLET, FILM COATED ORAL
Qty: 60 TABLET | Refills: 1 | Status: SHIPPED | OUTPATIENT
Start: 2021-02-24

## 2021-02-25 ENCOUNTER — COMMUNICATION - HEALTHEAST (OUTPATIENT)
Dept: RHEUMATOLOGY | Facility: CLINIC | Age: 41
End: 2021-02-25

## 2021-02-25 LAB — BACTERIA SPEC CULT: ABNORMAL

## 2021-03-02 ENCOUNTER — COMMUNICATION - HEALTHEAST (OUTPATIENT)
Dept: ADMINISTRATIVE | Facility: HOSPITAL | Age: 41
End: 2021-03-02

## 2021-03-02 LAB — DNA (DS) ANTIBODY - HISTORICAL: 2 IU

## 2021-03-15 ENCOUNTER — OFFICE VISIT - HEALTHEAST (OUTPATIENT)
Dept: RHEUMATOLOGY | Facility: CLINIC | Age: 41
End: 2021-03-15

## 2021-03-16 ENCOUNTER — COMMUNICATION - HEALTHEAST (OUTPATIENT)
Dept: RHEUMATOLOGY | Facility: CLINIC | Age: 41
End: 2021-03-16

## 2021-03-24 ENCOUNTER — COMMUNICATION - HEALTHEAST (OUTPATIENT)
Dept: FAMILY MEDICINE | Facility: CLINIC | Age: 41
End: 2021-03-24

## 2021-03-24 DIAGNOSIS — M32.9 DISEASE OF GINGIVA DUE TO LUPUS ERYTHEMATOSUS (H): ICD-10-CM

## 2021-03-24 DIAGNOSIS — K06.9 DISEASE OF GINGIVA DUE TO LUPUS ERYTHEMATOSUS (H): ICD-10-CM

## 2021-03-29 ENCOUNTER — OFFICE VISIT - HEALTHEAST (OUTPATIENT)
Dept: FAMILY MEDICINE | Facility: CLINIC | Age: 41
End: 2021-03-29

## 2021-03-29 DIAGNOSIS — D50.0 IRON DEFICIENCY ANEMIA DUE TO CHRONIC BLOOD LOSS: ICD-10-CM

## 2021-04-02 ENCOUNTER — AMBULATORY - HEALTHEAST (OUTPATIENT)
Dept: LAB | Facility: CLINIC | Age: 41
End: 2021-04-02

## 2021-04-02 DIAGNOSIS — D50.0 IRON DEFICIENCY ANEMIA DUE TO CHRONIC BLOOD LOSS: ICD-10-CM

## 2021-04-02 LAB
ERYTHROCYTE [DISTWIDTH] IN BLOOD BY AUTOMATED COUNT: 19.7 % (ref 11–14.5)
FERRITIN SERPL-MCNC: 47 NG/ML (ref 10–130)
HCT VFR BLD AUTO: 30.9 % (ref 35–47)
HGB BLD-MCNC: 9.4 G/DL (ref 12–16)
MCH RBC QN AUTO: 27.1 PG (ref 27–34)
MCHC RBC AUTO-ENTMCNC: 30.4 G/DL (ref 32–36)
MCV RBC AUTO: 89 FL (ref 80–100)
PLATELET # BLD AUTO: 339 THOU/UL (ref 140–440)
PMV BLD AUTO: 8.4 FL (ref 7–10)
RBC # BLD AUTO: 3.47 MILL/UL (ref 3.8–5.4)
WBC: 5.8 THOU/UL (ref 4–11)

## 2021-04-04 ENCOUNTER — COMMUNICATION - HEALTHEAST (OUTPATIENT)
Dept: FAMILY MEDICINE | Facility: CLINIC | Age: 41
End: 2021-04-04

## 2021-04-07 ENCOUNTER — COMMUNICATION - HEALTHEAST (OUTPATIENT)
Dept: FAMILY MEDICINE | Facility: CLINIC | Age: 41
End: 2021-04-07

## 2021-04-07 DIAGNOSIS — M32.9 DISEASE OF GINGIVA DUE TO LUPUS ERYTHEMATOSUS (H): ICD-10-CM

## 2021-04-07 DIAGNOSIS — K06.9 DISEASE OF GINGIVA DUE TO LUPUS ERYTHEMATOSUS (H): ICD-10-CM

## 2021-04-26 ENCOUNTER — OFFICE VISIT - HEALTHEAST (OUTPATIENT)
Dept: FAMILY MEDICINE | Facility: CLINIC | Age: 41
End: 2021-04-26

## 2021-04-26 DIAGNOSIS — M32.9 DISEASE OF GINGIVA DUE TO LUPUS ERYTHEMATOSUS (H): ICD-10-CM

## 2021-04-26 DIAGNOSIS — H01.003 BLEPHARITIS OF BOTH EYES, UNSPECIFIED EYELID, UNSPECIFIED TYPE: ICD-10-CM

## 2021-04-26 DIAGNOSIS — K06.9 DISEASE OF GINGIVA DUE TO LUPUS ERYTHEMATOSUS (H): ICD-10-CM

## 2021-04-26 DIAGNOSIS — H01.006 BLEPHARITIS OF BOTH EYES, UNSPECIFIED EYELID, UNSPECIFIED TYPE: ICD-10-CM

## 2021-04-26 DIAGNOSIS — H10.13 ALLERGIC CONJUNCTIVITIS, BILATERAL: ICD-10-CM

## 2021-04-26 RX ORDER — ERYTHROMYCIN 5 MG/G
OINTMENT OPHTHALMIC
Qty: 3.5 G | Refills: 0 | Status: SHIPPED | OUTPATIENT
Start: 2021-04-26 | End: 2021-08-07

## 2021-04-26 RX ORDER — OLOPATADINE HYDROCHLORIDE 1 MG/ML
1 SOLUTION/ DROPS OPHTHALMIC 2 TIMES DAILY
Qty: 5 ML | Refills: 1 | Status: SHIPPED | OUTPATIENT
Start: 2021-04-26

## 2021-05-05 ENCOUNTER — COMMUNICATION - HEALTHEAST (OUTPATIENT)
Dept: FAMILY MEDICINE | Facility: CLINIC | Age: 41
End: 2021-05-05

## 2021-05-05 DIAGNOSIS — M32.9 DISEASE OF GINGIVA DUE TO LUPUS ERYTHEMATOSUS (H): ICD-10-CM

## 2021-05-05 DIAGNOSIS — K06.9 DISEASE OF GINGIVA DUE TO LUPUS ERYTHEMATOSUS (H): ICD-10-CM

## 2021-05-20 ENCOUNTER — COMMUNICATION - HEALTHEAST (OUTPATIENT)
Dept: FAMILY MEDICINE | Facility: CLINIC | Age: 41
End: 2021-05-20

## 2021-05-20 DIAGNOSIS — M32.9 DISEASE OF GINGIVA DUE TO LUPUS ERYTHEMATOSUS (H): ICD-10-CM

## 2021-05-20 DIAGNOSIS — K06.9 DISEASE OF GINGIVA DUE TO LUPUS ERYTHEMATOSUS (H): ICD-10-CM

## 2021-05-20 RX ORDER — OXYCODONE AND ACETAMINOPHEN 10; 325 MG/1; MG/1
TABLET ORAL
Qty: 20 TABLET | Refills: 0 | Status: SHIPPED | OUTPATIENT
Start: 2021-05-20 | End: 2021-07-22

## 2021-05-26 ASSESSMENT — PATIENT HEALTH QUESTIONNAIRE - PHQ9: SUM OF ALL RESPONSES TO PHQ QUESTIONS 1-9: 1

## 2021-05-26 NOTE — TELEPHONE ENCOUNTER
RN cannot approve Refill Request    RN can NOT refill this medication med is not covered by policy/route to provider. Last office visit: 1/17/2019 Flakita Dawson MD Last Physical: Visit date not found Last MTM visit: Visit date not found Last visit same specialty: 1/17/2019 Flakita Dawson MD.  Next visit within 3 mo: Visit date not found  Next physical within 3 mo: Visit date not found      Yamilet Copeland, [facility] Triage/Med Refill 3/23/2019    Requested Prescriptions   Pending Prescriptions Disp Refills     viscous lidocaine HC (LIDOCAINE) 2 % Soln viscous solution [Pharmacy Med Name: LIDOCAINE 2% VISC ORAL SOLUTION] 200 mL 0     Sig: TAKE 5 ML BY MOUTH; RINSE AND GARGLE EVERY 2 HOURS AS NEEDED    There is no refill protocol information for this order

## 2021-05-27 ASSESSMENT — PATIENT HEALTH QUESTIONNAIRE - PHQ9: SUM OF ALL RESPONSES TO PHQ QUESTIONS 1-9: 0

## 2021-05-27 NOTE — TELEPHONE ENCOUNTER
Who is calling:  Patient  Reason for Call:  Checking on status of refill request  Date of last appointment with primary care: 1/17/19  Okay to leave a detailed message: Yes

## 2021-05-27 NOTE — TELEPHONE ENCOUNTER
Who is calling:   patient  Reason for Call:   Needs these medications ASAP  Date of last appointment with primary care:   1/17/2019  Okay to leave a detailed message: Yes

## 2021-05-27 NOTE — TELEPHONE ENCOUNTER
"Tried calling patient. It says \" the number you have you dialed is not a working number\" have tried calling patient multiple times.       If patient calls back ok to relay message. Rx lidocaine was sent over. However oxycodone is still pending. Please inform patient-providers have 72 business hours to address medication requests. Patient needs to be contacting pharmacy a week in advance for any medication requests to avoid any delays.     GJ/JAZMINE   "

## 2021-05-27 NOTE — TELEPHONE ENCOUNTER
Left message to call back for: Abbie Newman - pts emergency contact  Information to relay to patient:  Left detailed for Abbie stating that we are trying to get in touch with Suad and have been unsuccessful.  Instructed to have her give us a call back.  Please see message below and if pt calls back see if she can come in to see Dr. Dawson tomorrow for an office visit.

## 2021-05-27 NOTE — TELEPHONE ENCOUNTER
Patient stated I have a severe herpie out break around and in my mouth and I cant eat.   Please have a covering provider fill these Rx ASAP as Flakita Dawson MD is not in clinic until 3/27/19.  Patient declined triage stating I have had these outbreaks over 20 years.

## 2021-05-27 NOTE — TELEPHONE ENCOUNTER
I left pt a detailed message. We tried contacting pt a couple of days ago (2 days ago) and couldn't get a hold of pt.  PCP does not have anything available this week (pt was requesting to be seen on 4/17-yesterday).  Pt does have an appt on 4/26/19 and hopefully that still works for pt.  If not, pt can call back to reschedule, other no call back is needed.

## 2021-05-27 NOTE — TELEPHONE ENCOUNTER
LM for patient informing her that unfortunately Dr. Dawson does not have any openings for tomorrow, but our nurse practitioner Radhika Stoner has one opening at 9:20am (Same Day appointment) if she would like to take that slot.       If patient calls back please help her schedule for tomorrow at 9:20am OK per Radhika Stoner CNP.    ANNEMARIE/CMA

## 2021-05-27 NOTE — TELEPHONE ENCOUNTER
Patient Returning Call  Reason for call:  Patient calling back  Information relayed to patient:   She needs visit.  Patient has additional questions:  No  If YES, what are your questions/concerns:  Updated phone number and sent patient to scheduling  Okay to leave a detailed message?: No call back needed

## 2021-05-27 NOTE — TELEPHONE ENCOUNTER
New Appointment Needed  What is the reason for the visit:    Same Date/Next Day Appt Request  What is the reason for your visit?: Severe Anemia    Provider Preference: PCP only  How soon do you need to be seen?: tomorrow  Waitlist offered?: No  Okay to leave a detailed message:  Yes    Patient is currently scheduled on 4/26 but would like to be seen tomorrow 4/17.

## 2021-05-27 NOTE — TELEPHONE ENCOUNTER
Controlled Substance Refill Request  Medication Name:   Requested Prescriptions     Pending Prescriptions Disp Refills     viscous lidocaine HC (LIDOCAINE) 2 % Soln viscous solution 200 mL 0     Sig: RINSE AND GARGLE WITH 5ML BY MOUTH EVERY 2 HOURS AS NEEDED     oxyCODONE-acetaminophen (PERCOCET)  mg per tablet 20 tablet 0     Sig: Take 1 every 6-8 hours as needed for severe pain     Date Last Fill: 3/11/19  Pharmacy: [facility] on [address]       Submit electronically to pharmacy  Controlled Substance Agreement Date Scanned:   Encounter-Level CSA Scan Date - 04/23/2018:    Scan on 4/25/2018 11:57 AM (below)             Encounter-Level CSA Scan Date - 12/05/2016:    Scan on 12/5/2016 (below)         Last office visit with prescriber/PCP: 1/17/2019 Flakita Dawson MD OR same dept: 1/17/2019 Flakita Dawson MD OR same specialty: 1/17/2019 Flakita Dawson MD  Last physical: Visit date not found Last MTM visit: Visit date not found

## 2021-05-27 NOTE — TELEPHONE ENCOUNTER
RN cannot approve Refill Request    RN can NOT refill this medication med is not covered by policy/route to provider. Last office visit: 1/17/2019 Flakita Dawson MD Last Physical: Visit date not found Last MTM visit: Visit date not found Last visit same specialty: 1/17/2019 Flakita Dawson MD.  Next visit within 3 mo: Visit date not found  Next physical within 3 mo: Visit date not found      Jeaneth Galeana, [facility] Triage/Med Refill 4/16/2019    Requested Prescriptions   Pending Prescriptions Disp Refills     viscous lidocaine HC (LIDOCAINE) 2 % Soln viscous solution [Pharmacy Med Name: LIDOCAINE 2% VISC ORAL SOLUTION] 200 mL 0     Sig: TAKE 5 ML BY MOUTH; RINSE AND GARGLE EVERY 2 HOURS AS NEEDED       There is no refill protocol information for this order

## 2021-05-27 NOTE — TELEPHONE ENCOUNTER
Tried calling pt again and number is still not in service.  Will try contacting pts emergency contact.

## 2021-05-28 NOTE — TELEPHONE ENCOUNTER
Patient is completely out of this medication and has a current outbreak. She would also like to inform Dr Dawson that she has an appointment scheduled to see Dr Burroughs         Controlled Substance Refill Request  Medication Name:   Requested Prescriptions     Pending Prescriptions Disp Refills     oxyCODONE-acetaminophen (PERCOCET)  mg per tablet 20 tablet 0     Sig: Take 1 every 6-8 hours as needed for severe pain     Date Last Fill: 4/26/2019  Pharmacy: [id number]      Submit electronically to pharmacy  Controlled Substance Agreement Date Scanned:   Encounter-Level CSA Scan Date - 04/23/2018:    Scan on 4/25/2018 11:57 AM (below)             Encounter-Level CSA Scan Date - 12/05/2016:    Scan on 12/5/2016 (below)         Last office visit with prescriber/PCP: 4/26/2019 Flakita Dawson MD OR same dept: 4/26/2019 Flakita Dawson MD OR same specialty: 4/26/2019 Flakita Dawson MD  Last physical: Visit date not found Last MTM visit: Visit date not found

## 2021-05-28 NOTE — TELEPHONE ENCOUNTER
Patient Returning Call  Reason for call:  Caller returning call to check on the status of this request.  Information relayed to patient:  Pending providers review and approval.  Patient has additional questions:  yes  If YES, what are your questions/concerns:  Patient stated I am concerned as it is the weekend and I need this medication.   Okay to leave a detailed message?: Yes

## 2021-05-28 NOTE — TELEPHONE ENCOUNTER
Patient was informed of detailed message below. Patient understood and did not have any further questions/concerns.     GJ/JAZMINE

## 2021-05-28 NOTE — TELEPHONE ENCOUNTER
Call pt - this is early request.  I will fill on 5/22/19.  Last rx was done on 4/26/19 so next rx not due til 6/26/19.

## 2021-05-28 NOTE — TELEPHONE ENCOUNTER
Who is calling:  patient  Reason for Call:  Calling to check on below request. Patient reports she is out of medication. Please advise asap!  Date of last appointment with primary care: 4/26/19  Okay to leave a detailed message: Yes

## 2021-05-28 NOTE — TELEPHONE ENCOUNTER
Medication:  oxyCODONE-acetaminophen (PERCOCET)  mg per tablet  Pharmacy: [facility]   Last OV: 04/26/19  Last Refill: 04/26/19/ Q:20  Refills:0    Please advise on refill.     ANNEMARIE/JAZMINE

## 2021-05-28 NOTE — PROGRESS NOTES
"SUBJECTIVE: Suad Gongora is a 38 y.o. female with:  Chief Complaint   Patient presents with     Anemia     f/u     Medication Refill    1) Anemia-  She has been taking iron.  She did not go for the pelvic ultrasound I ordered at her last visit.  Her periods are less heavy than they were in the past.  No shortness of breath / dizziness.    2) Recurrent mouth ulcers - She is doing better.  Not as many outbreaks on her lips but she still gets outbreaks in her mouth that are very painful.  Wants to continue on the Percocet as needed for the pain.  Also using lidocaine topically.  She has not followed up with rheumatology since her last visit.    3) Dysuria- She is having some discomfort with urination and urinary frequency in last few days.  She will also get genital sores when she has outbreaks in her mouth.  Has tried antiviral medication but not helpful.    SH: Single.  Has several children.  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    OBJECTIVE: /70 (Patient Site: Left Arm, Patient Position: Sitting, Cuff Size: Adult Regular)   Pulse 72   Ht 5' 6\" (1.676 m)   Wt 145 lb (65.8 kg)   SpO2 98%   BMI 23.40 kg/m   no distress  OP: Lips are normal.  Pink and moist.  PSYCH:  Awake, alert, and oriented x 3.  Mood and affect are appropriate.  Good eye contact.  Speech is normal.  No suicidal ideation.  No hallucinations.    Recent Results (from the past 240 hour(s))   Hemoglobin   Result Value Ref Range    Hemoglobin 10.0 (L) 12.0 - 16.0 g/dL   Urinalysis-UC if Indicated   Result Value Ref Range    Color, UA Yellow Colorless, Yellow, Straw, Light Yellow    Clarity, UA Clear Clear    Glucose, UA Negative Negative    Bilirubin, UA Negative Negative    Ketones, UA Negative Negative    Specific Gravity, UA 1.025 1.005 - 1.030    Blood, UA Small (!) Negative    pH, UA 5.5 5.0 - 8.0    Protein, UA Negative Negative mg/dL    Urobilinogen, UA 0.2 E.U./dL 0.2 E.U./dL, 1.0 E.U./dL    Nitrite, UA " Negative Negative    Leukocytes, UA Moderate (!) Negative    Bacteria, UA Moderate (!) None Seen hpf    RBC, UA 3-5 (!) None Seen, 0-2 hpf    WBC, UA 10-25 (!) None Seen, 0-5 hpf    Squam Epithel, UA 25-50 (!) None Seen, 0-5 lpf    Mucus, UA Few (!) None Seen lpf   Culture, Urine   Result Value Ref Range    Culture 50,000-100,000 col/ml Escherichia coli (!)        Susceptibility    Escherichia coli - BANG     Amoxicillin + Clavulanate <=4/2 Sensitive      Ampicillin 8 Sensitive      Cefazolin <=1 Sensitive      Cefepime <=1 Sensitive      Ceftriaxone <=1 Sensitive      Ciprofloxacin 1 Sensitive      Gentamicin <=2 Sensitive      Levofloxacin <=1 Sensitive      Meropenem <=0.25 Sensitive      Nitrofurantoin 32 Sensitive      Tetracycline <=2 Sensitive      Tobramycin <=2 Sensitive      Trimethoprim + Sulfamethoxazole <=0.5 Sensitive       Suad was seen today for anemia and medication refill.    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  Will continue with pain medicine as needed.  Controlled substance contract signed.  She needs f/u every 3 months.  Needs to return to rheumatology for comprehensive treatment plan.  -     oxyCODONE-acetaminophen (PERCOCET)  mg per tablet; Take 1 every 6-8 hours as needed for severe pain  -     viscous lidocaine HC (LIDOCAINE) 2 % Soln viscous solution; TAKE 5 ML BY MOUTH; RINSE AND GARGLE EVERY 2 HOURS AS NEEDED    Ulcer aphthous oral  -     oxyCODONE-acetaminophen (PERCOCET)  mg per tablet; Take 1 every 6-8 hours as needed for severe pain    Iron deficiency anemia, unspecified iron deficiency anemia type- Improved.  Check u/s of pelvis to r/o fibroids/ endometrial abnormality. Continue iron.  -     Hemoglobin    Urinary tract infection without hematuria, site unspecified- Will treat with Septra DS for 1 week.  -     Urinalysis-UC if Indicated  -     Culture, Urine       Patient Instructions   Schedule  ultrasound:    [facility] [place]   Phone: [phone]    Make follow-up visit with rheumatology:    [facility]  Phone: [phone]    Continue iron daily    Follow-up in 3 months - need to keep appointment due to controlled substances     Flakita Dawson

## 2021-05-28 NOTE — PATIENT INSTRUCTIONS - HE
Schedule ultrasound:    [facility] [place]   Phone: [phone]    Make follow-up visit with rheumatology:    [facility]  Phone: [phone]    Continue iron daily    Follow-up in 3 months - need to keep appointment due to controlled substances

## 2021-05-28 NOTE — TELEPHONE ENCOUNTER
Medication: oxyCODONE-acetaminophen (PERCOCET)  mg per tablet  Pharmacy: [facility]   Last OV: 04/26/19   Last Refill: 04/26/19  Q: 20 Refills: 0    Please advise on refill.     ANNEMARIE/JAZMINE

## 2021-05-29 NOTE — TELEPHONE ENCOUNTER
RN cannot approve Refill Request    RN can NOT refill this medication med is not covered by policy/route to provider. Last office visit: 4/26/2019 Flakita Dawson MD Last Physical: Visit date not found Last MTM visit: Visit date not found Last visit same specialty: 4/26/2019 Flakita Dawson MD.  Next visit within 3 mo: Visit date not found  Next physical within 3 mo: Visit date not found      Yamilet Copeland, [facility] Triage/Med Refill 6/8/2019    Requested Prescriptions   Pending Prescriptions Disp Refills     viscous lidocaine HC (LIDOCAINE) 2 % Soln viscous solution [Pharmacy Med Name: LIDOCAINE 2% VISC ORAL SOLUTION] 200 mL 0     Sig: TAKE 5 ML BY MOUTH- RINSE AND GARGLE EVERY 2 HOURS AS NEEDED       There is no refill protocol information for this order

## 2021-05-30 NOTE — TELEPHONE ENCOUNTER
Call pt - she is due to see me based on controlled substance contract that was signed.  Needs visits every 3 months.  Also, she was supposed to follow-up with rheumatology.  I cannot continue to prescribe prednisone without a plan to treat her overall and rheumatology needs to be a part of this care plan.  Please have her make appointment with me and I will fill her oxycodone but she needs to keep appointment or I cannot continue to fill narcotics.

## 2021-05-30 NOTE — TELEPHONE ENCOUNTER
RN cannot approve Refill Request    RN can NOT refill this medication med is not covered by policy/route to provider. Last office visit: 4/26/2019 Flakita Dawson MD Last Physical: Visit date not found Last MTM visit: Visit date not found Last visit same specialty: 4/26/2019 Flakita Dawson MD.  Next visit within 3 mo: Visit date not found  Next physical within 3 mo: Visit date not found      Yamilet Copeland, [facility] Triage/Med Refill 7/20/2019    Requested Prescriptions   Pending Prescriptions Disp Refills     viscous lidocaine HC (LIDOCAINE) 2 % Soln viscous solution [Pharmacy Med Name: LIDOCAINE 2% VISC ORAL SOLUTION] 200 mL 0     Sig: SWISH WITH 5ML BY MOUTH EVERY 2 HOURS AS NEEDED       There is no refill protocol information for this order

## 2021-05-30 NOTE — TELEPHONE ENCOUNTER
Patient Returning Call  Reason for call:  Patient called back  Information relayed to patient: n/a  Patient has additional questions:  Yes  If YES, what are your questions/concerns:  Patient calling regarding the status of this medication refill.  Okay to leave a detailed message?: Yes

## 2021-05-30 NOTE — TELEPHONE ENCOUNTER
Informed patient that her letter has been done. Informed her she can access it through [facility]. Patient understood.     No further questions/concerns.     ANNEMARIE/JEANETTE

## 2021-05-30 NOTE — TELEPHONE ENCOUNTER
RN cannot approve Refill Request    RN can NOT refill this medication med is not covered by policy/route to provider     . Last office visit: 4/26/2019 Flakita Dawson MD Last Physical: Visit date not found Last MTM visit: Visit date not found Last visit same specialty: 4/26/2019 Flakita Dawson MD.  Next visit within 3 mo: Visit date not found  Next physical within 3 mo: Visit date not found      Anni Byrne, [facility] Triage/Med Refill 7/2/2019    Requested Prescriptions   Pending Prescriptions Disp Refills     viscous lidocaine HC (LIDOCAINE) 2 % Soln viscous solution [Pharmacy Med Name: LIDOCAINE 2% VISC ORAL SOLUTION] 200 mL 0     Sig: SWISH WITH 5 ML BY MOUTH EVERY 2 HOURS AS NEEDED       There is no refill protocol information for this order

## 2021-05-30 NOTE — TELEPHONE ENCOUNTER
Refill Approved    Rx renewed per Medication Renewal Policy. Medication was last renewed on 4.26.19.    Kamala Malcolm, [facility] Triage/Med Refill 6/26/2019     Requested Prescriptions   Pending Prescriptions Disp Refills     valACYclovir (VALTREX) 1000 MG tablet 21 tablet 5     Sig: One tablet three times a day for seven days       Antivirals Refill Protocol Passed - 6/26/2019 10:17 AM        Passed - Renal function done in last year     Creatinine   Date Value Ref Range Status   01/17/2019 0.62 0.60 - 1.10 mg/dL Final             Passed - Visit with PCP or prescribing provider visit in past 12 months or next 3 months     Last office visit with prescriber/PCP: 4/26/2019 Flakita Dawson MD OR same dept: 4/26/2019 Flakita Dawson MD OR same specialty: 4/26/2019 Flakita Dawson MD  Last physical: Visit date not found Last MTM visit: Visit date not found   Next visit within 3 mo: Visit date not found  Next physical within 3 mo: Visit date not found  Prescriber OR PCP: Flakita Dawson MD  Last diagnosis associated with med order: 1. Herpes simplex virus infection  - valACYclovir (VALTREX) 1000 MG tablet; One tablet three times a day for seven days  Dispense: 21 tablet; Refill: 5    If protocol passes may refill for 12 months if within 3 months of last provider visit (or a total of 15 months).             Passed - Patient does not have active pregnancy episode        Passed - Patient has not had positive pregnancy test in last 280 days     Beta hCG Qualitative   Date Value Ref Range Status   09/24/2015 Negative Negative Final     Pregnancy Test, Urine   Date Value Ref Range Status   11/13/2009 Negative  Final

## 2021-05-30 NOTE — TELEPHONE ENCOUNTER
Patient Returning Call  Reason for call:  Patient is checking on refill status of below request,  Information relayed to patient:  Advised would put request through again today  Patient has additional questions:  Yes  If YES, what are your questions/concerns:  Is in a lot of pain, hasn't had any medications for 2 days.  Is there a problem patient asking.  Okay to leave a detailed message?: Yes

## 2021-05-30 NOTE — TELEPHONE ENCOUNTER
Left message to call back for: Patient  Information to relay to patient:  Please get more information from pt.  What illness did/does she have?  Has she been seen for this recently?  In the past?  Dr. Dawson is out of the clinic today so this will have to wait to be addressed when she is back in clinic tomorrow potentially.

## 2021-05-30 NOTE — TELEPHONE ENCOUNTER
Refill Request  Did you contact pharmacy: patient was calling to check the status on another refill and reqeusted this refill to be completed as well.  Medication name:   Requested Prescriptions      No prescriptions requested or ordered in this encounter     Who prescribed the medication: Flakita Dawson MD   Pharmacy Name and Location: [facility])  Is patient out of medication: Yes  Patient notified refills processed in 72 hours:  no and patient requested this to be sent today prior to the pharmacy closing at 7:00 pm.   Okay to leave a detailed message: yes    Patient is requesting a phone call when this has been sent to her pharmacy.

## 2021-05-30 NOTE — TELEPHONE ENCOUNTER
Spoke with patient and she stated that she has an outbreak of herpes. Says her tongue is still swollen as well. Requesting note to be from  6/29/2019- 7/3/2019. Informed patient once PCP addresses this we will call her back.     No further questions/concerns.     ANNEMARIE/JEANETTE

## 2021-05-30 NOTE — TELEPHONE ENCOUNTER
Who is calling:  Patient  Reason for Call:  Patient was checking the status of her refill request. Patient was informed it's still pending. Writer had a hard time understanding the patient because patient's mouth was hurting a lot and she couldn't really talk. See other refill request also. Patient would like her refills addressed by a covering provider today. Patient declined triage.  Date of last appointment with primary care: 4/26/19  Okay to leave a detailed message: No

## 2021-05-30 NOTE — TELEPHONE ENCOUNTER
Patient is requesting this message to Flakita Dawson MD . Please inform Flakita Dawson MD that I have broke out if both areas and I just started a job and I really need both the percocet and the lidocaine refilled and I am going to try to see her on Friday.

## 2021-05-30 NOTE — TELEPHONE ENCOUNTER
Notified pt of message below.     Pt verbalizes understanding and agrees to schedule an appointment.

## 2021-05-30 NOTE — TELEPHONE ENCOUNTER
Who is requesting the letter?  Patient  Why is the letter needed? Patient is requesting a letter stating that she has missed work due to a recent illness.  Patient stated she has been out of work beginning Saturday 6/29/2019 through tomorrow 7/3/2019.  How would you like this letter returned? Patient would like to  this letter when it is completed.    Okay to leave a detailed message? Yes    Patient would like a phone call when the letter is completed.

## 2021-05-30 NOTE — TELEPHONE ENCOUNTER
Who is calling:  Patient   Reason for Call:  She is calling to ask that the letter noted below be changed to include the following dates:    7/06/2019 7/07/2019  Date of last appointment with primary care: 4/26/2019  Okay to leave a detailed message: Yes

## 2021-05-30 NOTE — TELEPHONE ENCOUNTER
Patient Returning Call  Reason for call:         Reason for call:  Patient is checking on refill status of below request,  Information relayed to patient:  Advised would put request through again today  Patient has additional questions:  Yes  If YES, what are your questions/concerns:  Is in a lot of pain, has n't had any medications for 2 days.  Is there a problem patient asking.  Okay to leave a detailed message?: Yes          I

## 2021-05-30 NOTE — TELEPHONE ENCOUNTER
RN cannot approve Refill Request    RN can NOT refill this medication med is not covered by policy/route to provider. Last office visit: 4/26/2019 Flakita Dawson MD Last Physical: Visit date not found Last MTM visit: Visit date not found Last visit same specialty: 4/26/2019 Flakita Dawson MD.  Next visit within 3 mo: Visit date not found  Next physical within 3 mo: Visit date not found      Ricarda Knutson, [facility] Triage/Med Refill 7/25/2019    Requested Prescriptions   Pending Prescriptions Disp Refills     viscous lidocaine HC (LIDOCAINE) 2 % Soln viscous solution 200 mL 0     Sig: SWISH WITH 5ML BY MOUTH EVERY 2 HOURS AS NEEDED       There is no refill protocol information for this order        predniSONE (DELTASONE) 5 MG tablet 30 tablet 5     Sig: Take 1 PO daily.       There is no refill protocol information for this order

## 2021-05-30 NOTE — TELEPHONE ENCOUNTER
Controlled Substance Refill Request  Medication Name:   Requested Prescriptions     Pending Prescriptions Disp Refills     oxyCODONE-acetaminophen (PERCOCET)  mg per tablet 20 tablet 0     Sig: Take 1 every 6-8 hours as needed for severe pain     Date Last Fill: 6/26/19  Pharmacy: [facility] #[id number]     Submit electronically to pharmacy  Controlled Substance Agreement Date Scanned:   Encounter-Level CSA Scan Date - 04/23/2018:    Scan on 4/25/2018 11:57 AM (below)             Encounter-Level CSA Scan Date - 12/05/2016:    Scan on 12/5/2016 (below)         Last office visit with prescriber/PCP: 4/26/2019 Flakita Dawson MD OR same dept: 4/26/2019 Flakita Dawson MD OR same specialty: 4/26/2019 Flakita Dawson MD  Last physical: Visit date not found Last MTM visit: Visit date not found

## 2021-05-30 NOTE — TELEPHONE ENCOUNTER
Refill Request  Did you contact pharmacy: No  Medication name:   Requested Prescriptions     Pending Prescriptions Disp Refills     viscous lidocaine HC (LIDOCAINE) 2 % Soln viscous solution 200 mL 0     Sig: SWISH WITH 5ML BY MOUTH EVERY 2 HOURS AS NEEDED     predniSONE (DELTASONE) 5 MG tablet 30 tablet 5     Sig: Take 1 PO daily.     Who prescribed the medication: Flakita Dawson MD   Pharmacy Name and Location: [facility] #[id number]  Is patient out of medication: No, but patient stated she is almost out of the Lidocaine solution, even though it was just filled on 7/20/19.  Patient notified refills processed in 72 hours:  yes  Okay to leave a detailed message: no

## 2021-05-30 NOTE — TELEPHONE ENCOUNTER
New Appointment Needed  What is the reason for the visit:    The patient is suffering from a Lupus and Herpes outbreak and was wondering if a provider could see her this week sometime.  The patient has been missing work due to this breakout and will need a doctors note as well.  Provider Preference: Any available  How soon do you need to be seen?: this week  Waitlist offered?: No  Okay to leave a detailed message:  Yes

## 2021-05-30 NOTE — TELEPHONE ENCOUNTER
Controlled Substance Refill Request  Medication Name:   Requested Prescriptions     Pending Prescriptions Disp Refills     viscous lidocaine HC (LIDOCAINE) 2 % Soln viscous solution 200 mL 0     Sig: TAKE 5 ML BY MOUTH- RINSE AND GARGLE EVERY 2 HOURS AS NEEDED     oxyCODONE-acetaminophen (PERCOCET)  mg per tablet 20 tablet 0     Sig: Take 1 every 6-8 hours as needed for severe pain     Date Last Fill: 5/22/19  Pharmacy: [facility]      Submit electronically to pharmacy  Controlled Substance Agreement Date Scanned:   Encounter-Level CSA Scan Date - 04/23/2018:    Scan on 4/25/2018 11:57 AM (below)             Encounter-Level CSA Scan Date - 12/05/2016:    Scan on 12/5/2016 (below)         Last office visit with prescriber/PCP: 4/26/2019 Flakita Dawson MD OR same dept: 4/26/2019 Flakita Dawson MD OR same specialty: 4/26/2019 Flakita Dawson MD  Last physical: Visit date not found Last MTM visit: Visit date not found

## 2021-05-30 NOTE — TELEPHONE ENCOUNTER
Patient Returning Call  Reason for call:  Caller returning call to check on the status of this request.  Information relayed to patient:  Pending providers review and approval.  Patient has additional questions:  Yes   If YES, what are your questions/concerns:  Please assist with this ASAP patient stated I am having a lupus flare up causing a lot of pain and I can barely talk.  Okay to leave a detailed message?: Yes

## 2021-05-31 VITALS — WEIGHT: 121 LBS | BODY MASS INDEX: 19.44 KG/M2 | HEIGHT: 66 IN

## 2021-05-31 VITALS — BODY MASS INDEX: 19.01 KG/M2 | HEIGHT: 66 IN | WEIGHT: 118.25 LBS

## 2021-05-31 VITALS — BODY MASS INDEX: 19.21 KG/M2 | WEIGHT: 119 LBS

## 2021-05-31 VITALS — WEIGHT: 125.25 LBS | BODY MASS INDEX: 20.13 KG/M2 | HEIGHT: 66 IN

## 2021-05-31 NOTE — PROGRESS NOTES
OFFICE VISIT - FAMILY MEDICINE     ASSESSMENT AND PLAN     1. Vaginal discharge  Wet Prep, Vaginal    Chlamydia trachomatis & Neisseria gonorrhoeae, Amplified Detection    Varicella-Zoster Virus DNA by PCR, CSF or Skin Swab(VZDNA)   2. 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 Herpes Simplex  acyclovir (ZOVIRAX) 400 MG tablet   Recurrent genital herpes, gingivostomatitis, we did a vaginal swab today, she will be notified of the result but has had positive swab in the past, we discussed prophylactic treatment with acyclovir, but is interested, has not had any success with Valtrex in the past.  Possible side effect of daily prophylactic treatment discussed, new prescription sent.  Joint pain  exacerbation we discussed  minimizing narcotic use, could consider plain Tylenol as needed, short supply of oxycodone given,follow-up with Dr. Dawson in a couple of weeks.    CHIEF COMPLAINT   Herpes Zoster (currently having outbreak in mouth; vaginal); Lupus (having really bad joint pain, happens 1 a week. Would like to get pain medication.); and Urinary Frequency (started abourt 1 week ago. )    HPI   Suad Gongora is a 39 y.o. female.  No Patient Care Coordination Note on file.  She is having recurrent genital herpes with burning sensation inside her vagina, mild discharge.  Has used topical lidocaine in the past, would like a refill.  On further questioning she stated that she get to 3 flareups a month, we discussed about prophylactic therapy, she has never tried in the past but open to the idea.  Did not have any success with Valtrex in the past made her feel sick.  Lupus with chronic joint pain, recent exacerbation with shoulder pain, no trauma no swelling or redness.  She is out of her pain Would like a Refill.      Review of Systems As per HPI, otherwise negative.    OBJECTIVE   BP 93/60  (Patient Site: Left Arm, Patient Position: Sitting, Cuff Size: Adult Regular)   Pulse 71   Wt 143 lb 4 oz (65 kg)   LMP 2019   SpO2 100%   BMI 23.12 kg/m    Physical Exam   Constitutional: She appears well-developed. No distress.   Genitourinary: Vagina normal.   Genitourinary Comments: Mild vaginal discharge in the vault, minimal erythema in the left vaginal wall with no obvious visible herpetic lesions.  Vaginal swab was done.  Exam chaperoned by Gayla nathan CNA   Skin: She is not diaphoretic.       PFSH   No family history on file.  Social History     Socioeconomic History     Marital status: Single     Spouse name: Not on file     Number of children: Not on file     Years of education: Not on file     Highest education level: Not on file   Occupational History     Not on file   Social Needs     Financial resource strain: Not on file     Food insecurity:     Worry: Not on file     Inability: Not on file     Transportation needs:     Medical: Not on file     Non-medical: Not on file   Tobacco Use     Smoking status: Former Smoker     Last attempt to quit: 2017     Years since quittin.2     Smokeless tobacco: Never Used   Substance and Sexual Activity     Alcohol use: Not on file     Drug use: Not on file     Sexual activity: Not on file   Lifestyle     Physical activity:     Days per week: Not on file     Minutes per session: Not on file     Stress: Not on file   Relationships     Social connections:     Talks on phone: Not on file     Gets together: Not on file     Attends Advent service: Not on file     Active member of club or organization: Not on file     Attends meetings of clubs or organizations: Not on file     Relationship status: Not on file     Intimate partner violence:     Fear of current or ex partner: Not on file     Emotionally abused: Not on file     Physically abused: Not on file     Forced sexual activity: Not on file   Other Topics Concern     Not on file   Social History  Narrative     Not on file     Relevant history was reviewed with the patient today, unless noted in HPI, nothing is pertinent for this visit.  [facility]     Patient Active Problem List    Diagnosis Date Noted     Lupus (H) 10/26/2017     Elevated antinuclear antibody (ELDA) level 09/06/2017     Abdominal pain 09/24/2015     Depression      Overview Note:     Created by Conversion         Herpes Simplex      Overview Note:     Created by Conversion    Replacement Utility updated for latest IMO load       Skin: A Rash      Overview Note:     Created by Conversion         Vitamin D Deficiency      Overview Note:     Created by Conversion    Replacement Utility updated for latest IMO load       Iron deficiency anemia      Overview Note:     Created by Conversion         Recurrent Aphthous Ulcer      Overview Note:     Created by Conversion         No past surgical history on file.    RESULTS/CONSULTS (Lab/Rad)     Recent Results (from the past 168 hour(s))   Wet Prep, Vaginal   Result Value Ref Range    Yeast Result No yeast seen No yeast seen    Trichomonas No Trichomonas seen No Trichomonas seen    Clue Cells, Wet Prep No Clue cells seen No Clue cells seen     No results found.  MEDICATIONS     Current Outpatient Medications on File Prior to Visit   Medication Sig Dispense Refill     ferrous gluconate (FERGON) 324 MG tablet Take 1 po BID 60 tablet 11     [DISCONTINUED] oxyCODONE-acetaminophen (PERCOCET)  mg per tablet Take 1 every 6-8 hours as needed for severe pain 20 tablet 0     [DISCONTINUED] viscous lidocaine HC (LIDOCAINE) 2 % Soln viscous solution SWISH WITH 5ML BY MOUTH EVERY 2 HOURS AS NEEDED 200 mL 2     [DISCONTINUED] hydroxychloroquine (PLAQUENIL) 200 mg tablet Take 1 tablet p.o. twice daily. 60 tablet 2     [DISCONTINUED] predniSONE (DELTASONE) 5 MG tablet Take 1 PO daily. 30 tablet 5     [DISCONTINUED] ranitidine (ZANTAC) 150 MG tablet Take 1 po two times a day while on prednsione 60 tablet 5      [DISCONTINUED] traZODone (DESYREL) 50 MG tablet Take 1 tablet (50 mg total) by mouth at bedtime. 30 tablet 0     [DISCONTINUED] valACYclovir (VALTREX) 1000 MG tablet One tablet three times a day for seven days 21 tablet 5     No current facility-administered medications on file prior to visit.        HEALTH MAINTENANCE / SCREENING   PHQ-2 Total Score: 2 (8/7/2019  4:00 PM)  , PHQ-9 Total Score: 8 (8/7/2019  4:00 PM)  ,No data recorded  Immunization History   Administered Date(s) Administered     Hep A, Adult IM (19yr & older) 03/05/2010, 03/29/2011     Hep A, historic 03/05/2010, 03/29/2011     PPD Test 10/20/2008, 09/29/2015, 06/29/2016     Td, adult adsorbed, PF 03/01/2006     Td,adult,historic,unspecified 03/01/2006     Health Maintenance   Topic     PREVENTIVE CARE VISIT      TDAP ADULT ONE TIME DOSE      ADVANCE CARE PLANNING      TD 18+ HE      PAP SMEAR      INFLUENZA VACCINE RULE BASED (1)     DEPRESSION FOLLOW UP      HIV SCREENING        Cydney Lu MD  Family Medicine, [facility]     This note was dictated using a voice recognition software.  Any grammatical or context distortion are unintentional and inherent to the software.

## 2021-05-31 NOTE — TELEPHONE ENCOUNTER
Tried contacting patient. Phone only rings. If patient calls back please relay test results.     ANNEMARIE/JEANETTE

## 2021-05-31 NOTE — TELEPHONE ENCOUNTER
----- Message from Flakita Dawson MD sent at 8/21/2019  1:59 PM CDT -----  Call pt - TB test was negative

## 2021-05-31 NOTE — TELEPHONE ENCOUNTER
Who is calling:  Patient  Reason for Call:  Please call with a date for TB Lab  Date of last appointment with primary care: NA  Okay to leave a detailed message: Yes [phone]

## 2021-05-31 NOTE — TELEPHONE ENCOUNTER
RN cannot approve Refill Request    RN can NOT refill this medication med is not covered by policy/route to provider. Last office visit: 4/26/2019 Flakita Dawson MD Last Physical: Visit date not found Last MTM visit: Visit date not found Last visit same specialty: 4/26/2019 Flakita Dawson MD.  Next visit within 3 mo: Visit date not found  Next physical within 3 mo: Visit date not found      Jeaneth Galeana, [facility] Triage/Med Refill 8/6/2019    Requested Prescriptions   Pending Prescriptions Disp Refills     viscous lidocaine HC (LIDOCAINE) 2 % Soln viscous solution [Pharmacy Med Name: LIDOCAINE 2% VISC ORAL SOLUTION] 200 mL 0     Sig: SWISH WITH 5ML EVERY TWO HOURS AS NEEDED       There is no refill protocol information for this order

## 2021-05-31 NOTE — PATIENT INSTRUCTIONS - HE
Try eating following foods:    Chicken  [place]/ fish  Rice  Vegetables  Fruit  Olive oil  Avocados

## 2021-05-31 NOTE — PROGRESS NOTES
"SUBJECTIVE: Suad Gongora is a 39 y.o. female with:  Chief Complaint   Patient presents with     Medication check    1) Iron deficiency anemia - She continues to have heavy periods.  Bleeds for 4 days with heavy bleeding.  No digestive problems- denies any epigastric pain / bowel issues.  She was referred for pelvic ultrasound in past but has not followed through with the test.  She takes iron off and on.    2) She has intolerance to dairy.  Taking probiotics.  Has been on gluten-free diet off and on.  Her mouth sores do seem better when she is not eating gluten but difficult to follow the diet.    3) She returns for f/u of oral lesions.  She has had 3 episodes in one month.  She started smoking cigarettes again.  Is concerned she will be let go from her job due to taking time off when she has episodes.  Continues to use lidocaine solution and oxycodone/ APAP for the pain.  She was seen by rheumatology 1 year ago and placed on prednisone taper and plaquenil but she did not return for her f/u visit.    SH: Single.  Has children.  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    OBJECTIVE: /90 (Patient Site: Right Arm, Patient Position: Sitting, Cuff Size: Adult Regular)   Pulse 92   Ht 5' 6\" (1.676 m)   Wt 141 lb 12.8 oz (64.3 kg)   LMP 07/31/2019   SpO2 99%   BMI 22.89 kg/m   no distress  OP: She has swollen lips and multiple oral ulcers in her mouth.    Lab Results   Component Value Date    WBC 6.8 08/07/2019    HGB 9.4 (L) 08/07/2019    HCT 29.4 (L) 08/07/2019    MCV 85 08/07/2019     (H) 08/07/2019         Suad was seen today for medication check.    Diagnoses and all orders for this visit:    Menorrhagia with regular cycle- She continues to have anemia.  Check for fibroids.  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  I emphasized need for her to keep upcoming " appointment.  She also needs visits with me every 3 months to monitor her use of narcotics.  I am going to screen for nutritional deficiencies and try having her follow an elimination diet to see if we can get some control over her frequent outbreaks.  -     oxyCODONE-acetaminophen (PERCOCET)  mg per tablet; Take 1 every 6-8 hours as needed for severe pain  -     viscous lidocaine HC (LIDOCAINE) 2 % Soln viscous solution; SWISH WITH 5ML BY MOUTH EVERY 2 HOURS AS NEEDED  -     Vitamin B12  -     Zinc, Serum or Plasma  -     Folate, Serum  -     Niacin (Vitamin B3), Plasma  -     Riboflavin (Vitamin B2), Plasma  -     Vitamin B6 (Pyridoxal 5-Phosphate      Iron deficiency anemia, unspecified iron deficiency anemia type- She needs to be on iron regularly.  Consider iron infusion if unable to increase iron by oral supplements.  -     HM2(CBC w/o Differential)  -     Ferritin  -     Iron and Transferrin Iron Binding Capacity     F/U in 3 months.    Patient Instructions   Try eating following foods:    Chicken  [place]/ fish  [place]  Vegetables  Fruit  Olive oil  Jud Dawson

## 2021-05-31 NOTE — TELEPHONE ENCOUNTER
New Appointment Needed  What is the reason for the visit:    Mantoux Placement  Appt Request  What is the purpose of the mantoux?:  Work: [employer]     Is there a form to be completed?:   Yes    How soon do you need the mantoux placed?:  date: Asap    Provider Preference: Lab     How soon do you need to be seen?: Asap    Waitlist offered?: No    Okay to leave a detailed message:  Yes

## 2021-06-01 VITALS — WEIGHT: 131 LBS | BODY MASS INDEX: 21.05 KG/M2 | HEIGHT: 66 IN

## 2021-06-01 VITALS — HEIGHT: 66 IN | BODY MASS INDEX: 20.41 KG/M2 | WEIGHT: 127 LBS

## 2021-06-01 VITALS — WEIGHT: 140 LBS | BODY MASS INDEX: 22.5 KG/M2 | HEIGHT: 66 IN

## 2021-06-01 VITALS — WEIGHT: 133.25 LBS | HEIGHT: 66 IN | BODY MASS INDEX: 21.41 KG/M2

## 2021-06-01 VITALS — HEIGHT: 66 IN | WEIGHT: 140 LBS | BODY MASS INDEX: 22.5 KG/M2

## 2021-06-01 NOTE — TELEPHONE ENCOUNTER
Pt calling to see if medication was refilled.     Disp Refills Start End    oxyCODONE-acetaminophen (PERCOCET)  mg per tablet 10 tablet 0 9/6/2019     Sig: Take 1 every 6-8 hours as needed for severe pain    Sent to pharmacy as: oxyCODONE-acetaminophen (PERCOCET)  mg per tablet    Earliest Fill Date: 9/6/2019    E-Prescribing Status: Receipt confirmed by pharmacy (9/6/2019  5:55 PM CDT)        Pt notified that rx was sent at 5:55pm.  Pt stated understanding.  Dianna Smyth, RN   [facility] RN Triage

## 2021-06-01 NOTE — TELEPHONE ENCOUNTER
Controlled Substance Refill Request  Medication Name:   Requested Prescriptions     Pending Prescriptions Disp Refills     oxyCODONE-acetaminophen (PERCOCET)  mg per tablet 10 tablet 0     Sig: Take 1 every 6-8 hours as needed for severe pain     Date Last Fill: 9/6/19  Pharmacy: [facility] #[id number]      Submit electronically to pharmacy  Controlled Substance Agreement Date Scanned:   Encounter-Level CSA Scan Date - 04/23/2018:    Scan on 4/25/2018 11:57 AM           Encounter-Level CSA Scan Date - 12/05/2016:    Scan on 12/5/2016       Last office visit with prescriber/PCP: 8/7/2019 Flakita Dawson MD OR same dept: 8/29/2019 Cydney Lu MD OR same specialty: 8/29/2019 Cydney Lu MD  Last physical: Visit date not found Last MTM visit: Visit date not found        Patient stated that she is taking 1 to 1.5 tablet depending on the pain. Patient stated that she is out of her medication.

## 2021-06-01 NOTE — TELEPHONE ENCOUNTER
RN cannot approve Refill Request    RN can NOT refill this medication med is not covered by policy/route to provider       . Last office visit: 8/7/2019 Flakita Dawson MD Last Physical: Visit date not found Last MTM visit: Visit date not found Last visit same specialty: 8/29/2019 Cydney Lu MD.  Next visit within 3 mo: Visit date not found  Next physical within 3 mo: Visit date not found      Anni Byrne, [facility] Triage/Med Refill 9/30/2019    Requested Prescriptions   Pending Prescriptions Disp Refills     viscous lidocaine HC (LIDOCAINE) 2 % Soln viscous solution 200 mL 2     Sig: SWISH WITH 5ML BY MOUTH EVERY 2 HOURS AS NEEDED       There is no refill protocol information for this order

## 2021-06-01 NOTE — TELEPHONE ENCOUNTER
Medication:oxyCODONE-acetaminophen (PERCOCET)  mg per tablet    Pharmacy:[facility] #[id number] - [place], [place] - [address] [place] [address] AT [facility] [address]    Last Office Visit:8/29/19    Last Refill:9/6/19    Quantity:10    Refills: 0

## 2021-06-01 NOTE — TELEPHONE ENCOUNTER
Medication: oxyCODONE-acetaminophen (PERCOCET)  mg per tablet  Pharmacy: [facility], [place]   Last OV: 08/07/19  Last Refill: 09/06/19 Q: 10 Refills: 0    Please advise on refill.     ANNEMARIE/JEANETTE

## 2021-06-01 NOTE — TELEPHONE ENCOUNTER
Pt calling to follow-up on refill request.   RN advised it is still pending review by provider.   Pt was advised by RN to ensure 72 hours for refill requests. Pt stated understanding.  RN will route to PCP for follow up.  Dianna Smyth, RN   [facility] RN Triage

## 2021-06-01 NOTE — TELEPHONE ENCOUNTER
Refill Request  Did you contact pharmacy: No.  Patient was informed to call the pharmacy.  Medication name:   Requested Prescriptions     Pending Prescriptions Disp Refills     viscous lidocaine HC (LIDOCAINE) 2 % Soln viscous solution 200 mL 2     Sig: SWISH WITH 5ML BY MOUTH EVERY 2 HOURS AS NEEDED     Who prescribed the medication: Flakita Dawson MD   Pharmacy Name and Location: [facility] # [id number]  Is patient out of medication: Yes  Patient notified refills processed in 72 hours:  yes  Okay to leave a detailed message: yes  [phone]

## 2021-06-01 NOTE — TELEPHONE ENCOUNTER
FYI - Status Update  Who is Calling: Patient  Update: Patient called again to check on the status of this medication.  Okay to leave a detailed message?:  No return call needed

## 2021-06-01 NOTE — TELEPHONE ENCOUNTER
Controlled Substance Refill Request  Medication Name:   Requested Prescriptions     Pending Prescriptions Disp Refills     oxyCODONE-acetaminophen (PERCOCET)  mg per tablet 10 tablet 0     Sig: Take 1 every 6-8 hours as needed for severe pain     Date Last Fill: 8/29/2019  Pharmacy: [facility]      Submit electronically to pharmacy  Controlled Substance Agreement Date Scanned:   Encounter-Level CSA Scan Date - 04/23/2018:    Scan on 4/25/2018 11:57 AM (below)             Encounter-Level CSA Scan Date - 12/05/2016:    Scan on 12/5/2016 (below)         Last office visit with prescriber/PCP: 8/7/2019 Flakita Dawson MD OR same dept: 8/29/2019 Cydney Lu MD OR same specialty: 8/29/2019 Cydney Lu MD  Last physical: Visit date not found Last MTM visit: Visit date not found      Patient states she was only given 10 pills and she usually gets 20 pills. Can provider send the 10 tablets to the pharmacy today as she works all this weekend.  Please address today and call patient when med is sent.

## 2021-06-01 NOTE — TELEPHONE ENCOUNTER
See refill encounter from today for documentation.   Dianna Smyth, RN   [facility] RN Triage

## 2021-06-01 NOTE — TELEPHONE ENCOUNTER
Who is calling:  Patient  Reason for Call:  She is asking if the on-call doctor can fill her pain medication today.  She is out of medication and having a flare up of Lupus, joint pain and mouth sores.  Date of last appointment with primary care: 8/29/19  Okay to leave a detailed message: Yes

## 2021-06-02 VITALS — WEIGHT: 145 LBS | HEIGHT: 66 IN | BODY MASS INDEX: 23.3 KG/M2

## 2021-06-02 VITALS — BODY MASS INDEX: 22.2 KG/M2 | HEIGHT: 66 IN | WEIGHT: 138.12 LBS

## 2021-06-02 NOTE — TELEPHONE ENCOUNTER
CSA 4/26/19    Last fill:     Disp Refills Start End KRYS   oxyCODONE-acetaminophen (PERCOCET)  mg per tablet 10 tablet 0 10/7/2019  No   Sig: Take 1 every 6-8 hours as needed for severe pain   Class: Print   Earliest Fill Date: 10/7/2019     refill done

## 2021-06-02 NOTE — TELEPHONE ENCOUNTER
Who is calling:  Patient   Reason for Call:  Patient is calling in for  Follow up on refill medication below . Patient requesting to process as soon as possible.  Date of last appointment with primary care: 8/29/19  Okay to leave a detailed message: No

## 2021-06-02 NOTE — TELEPHONE ENCOUNTER
Call pt - She was supposed to see rheumatology - this was part of our treatment plan.  Please see why she did not go.  Needs to reschedule an appointment as otherwise we are just treating symptoms.

## 2021-06-02 NOTE — TELEPHONE ENCOUNTER
Who is calling:  Patient  Reason for Call:  Please review asap.  Patient is out of supply  Date of last appointment with primary care: NA  Okay to leave a detailed message: No

## 2021-06-02 NOTE — TELEPHONE ENCOUNTER
I have refilled med.  Please tell pt that she needs to keep this appointment with rheumatology otherwise I cannot keep filling her pain medicine since this is only treating symptoms.

## 2021-06-02 NOTE — TELEPHONE ENCOUNTER
RN cannot approve Refill Request    RN can NOT refill this medication med is not covered by policy/route to provider     . Last office visit: 8/7/2019 Flakita Dawson MD Last Physical: Visit date not found Last MTM visit: Visit date not found Last visit same specialty: 8/29/2019 Cydney Lu MD.  Next visit within 3 mo: Visit date not found  Next physical within 3 mo: Visit date not found      Anni Byrne, [facility] Triage/Med Refill 10/28/2019    Requested Prescriptions   Pending Prescriptions Disp Refills     viscous lidocaine HC (LIDOCAINE) 2 % Soln viscous solution [Pharmacy Med Name: LIDOCAINE 2% VISC ORAL SOLUTION] 200 mL 2     Sig: SWISH WITH 5 ML BY MOUTH EVERY 2 HOURS AS NEEDED       There is no refill protocol information for this order

## 2021-06-02 NOTE — TELEPHONE ENCOUNTER
Patient Returning Call  Reason for call:  Returning clinic call  Information relayed to patient:  Patient was read the note written by Dr Dawson today, Will be in tomorrow 10/9/19 to  the prescription, and yes she is for sure going to the other appointment.  Patient has additional questions:  No  If YES, what are your questions/concerns:  NA  Okay to leave a detailed message?: No call back needed

## 2021-06-02 NOTE — TELEPHONE ENCOUNTER
Patient stated she is requesting the remained of her medication be sent to her pharmacy.  Patient stated she recently received only part of the amount she is usually prescribed.    Controlled Substance Refill Request  Medication Name:   Requested Prescriptions     Pending Prescriptions Disp Refills     oxyCODONE-acetaminophen (PERCOCET)  mg per tablet 10 tablet 0     Sig: Take 1 every 6-8 hours as needed for severe pain     Date Last Fill: 10/1/2019  Pharmacy: [facility] ([place]/[facility])      Submit electronically to pharmacy  Controlled Substance Agreement Date Scanned:   Encounter-Level CSA Scan Date - 04/23/2018:    Scan on 4/25/2018 11:57 AM           Encounter-Level CSA Scan Date - 12/05/2016:    Scan on 12/5/2016       Last office visit with prescriber/PCP: 8/7/2019 Flakita Dawson MD OR same dept: 8/29/2019 Cydney Lu MD OR same specialty: 8/29/2019 Cydney Lu MD  Last physical: Visit date not found Last MTM visit: Visit date not found

## 2021-06-03 VITALS
BODY MASS INDEX: 23.01 KG/M2 | DIASTOLIC BLOOD PRESSURE: 70 MMHG | HEIGHT: 66 IN | WEIGHT: 143.2 LBS | SYSTOLIC BLOOD PRESSURE: 110 MMHG | HEART RATE: 84 BPM

## 2021-06-03 VITALS — BODY MASS INDEX: 23.12 KG/M2 | WEIGHT: 143.25 LBS

## 2021-06-03 VITALS — WEIGHT: 141.8 LBS | BODY MASS INDEX: 22.79 KG/M2 | HEIGHT: 66 IN

## 2021-06-03 NOTE — TELEPHONE ENCOUNTER
Medication:   Disp Refills Start End KRYS   oxyCODONE-acetaminophen (PERCOCET)  mg per tablet 10 tablet 0 11/8/2019  No   Sig: Take 1 every 6-8 hours as needed for severe pain   Class: Print     [id number] - [place], [place] - [address] [place] [address] AT [facility] [address]    Last Office Visit: 8/29/19    Last Refill:11/8/19    Quantity:10    Refills: 0

## 2021-06-03 NOTE — TELEPHONE ENCOUNTER
Who is calling:  Patient  Reason for Call:  Patient is requesting status of below medication.  This nurse relayed below message from Provider. Patient has Rheumatology appointment on 12/17/19.  Transferred patient to scheduling to make appointment with PCP.  Date of last appointment with primary care: 8/29/19  Okay to leave a detailed message: Yes

## 2021-06-03 NOTE — TELEPHONE ENCOUNTER
She needs to be seen every 3 months so will fill at her next appointment.  She also was supposed to follow-up with rheumatology as part of our treatment plan.

## 2021-06-03 NOTE — TELEPHONE ENCOUNTER
Controlled Substance Refill Request  Medication Name:   Requested Prescriptions     Pending Prescriptions Disp Refills     oxyCODONE-acetaminophen (PERCOCET)  mg per tablet 10 tablet 0     Sig: Take 1 every 6-8 hours as needed for severe pain     Date Last Fill: 11/8/19  Pharmacy: [facility] #[id number]      Submit electronically to pharmacy  Controlled Substance Agreement Date Scanned:   Encounter-Level CSA Scan Date - 04/23/2018:    Scan on 4/25/2018 11:57 AM           Encounter-Level CSA Scan Date - 12/05/2016:    Scan on 12/5/2016       Last office visit with prescriber/PCP: 8/7/2019 Flakita Dawson MD OR same dept: 8/29/2019 Cydney Lu MD OR same specialty: 8/29/2019 Cydney Lu MD  Last physical: Visit date not found Last MTM visit: Visit date not found

## 2021-06-03 NOTE — TELEPHONE ENCOUNTER
Controlled Substance Refill Request  Medication Name:   Requested Prescriptions     Pending Prescriptions Disp Refills     oxyCODONE-acetaminophen (PERCOCET)  mg per tablet 10 tablet 0     Sig: Take 1 every 6-8 hours as needed for severe pain   Date Last Fill: 10/25/19  Pharmacy: [facility] # [id number]      Submit electronically to pharmacy  Controlled Substance Agreement Date Scanned:   Encounter-Level CSA Scan Date - 04/23/2018:    Scan on 4/25/2018 11:57 AM           Encounter-Level CSA Scan Date - 12/05/2016:    Scan on 12/5/2016       Last office visit with prescriber/PCP: 8/7/2019 Flakita Dawson MD OR same dept: 8/29/2019 Cydney Lu MD OR same specialty: 8/29/2019 Cydney Lu MD  Last physical: Visit date not found Last MTM visit: Visit date not found

## 2021-06-03 NOTE — TELEPHONE ENCOUNTER
RN cannot approve Refill Request    RN can NOT refill this medication med is not covered by policy/route to provider. Last office visit: 8/7/2019 Flakita Dawson MD Last Physical: Visit date not found Last MTM visit: Visit date not found Last visit same specialty: 8/29/2019 Cydney Lu MD.  Next visit within 3 mo: Visit date not found  Next physical within 3 mo: Visit date not found      Kamala Malcolm, [facility] Triage/Med Refill 12/3/2019    Requested Prescriptions   Pending Prescriptions Disp Refills     viscous lidocaine HC (LIDOCAINE) 2 % Soln viscous solution 200 mL 2     Sig: SWISH WITH 5 ML BY MOUTH EVERY 2 HOURS AS NEEDED       There is no refill protocol information for this order

## 2021-06-04 VITALS
HEIGHT: 66 IN | WEIGHT: 143 LBS | DIASTOLIC BLOOD PRESSURE: 68 MMHG | SYSTOLIC BLOOD PRESSURE: 100 MMHG | BODY MASS INDEX: 22.98 KG/M2 | HEART RATE: 89 BPM | OXYGEN SATURATION: 100 %

## 2021-06-04 NOTE — TELEPHONE ENCOUNTER
Pt cancel appointment on 12/6 and wanted to be seen today but looks like the message did not get route to you yet. Can you add her on today or does it has to be another day?

## 2021-06-04 NOTE — TELEPHONE ENCOUNTER
New Appointment Needed  What is the reason for the visit:    Needs new appt wasn't able to make it to 12/6 due to transportation trying to be seen 12/9 for closing   Provider Preference: PCP only  How soon do you need to be seen?: next week  Waitlist offered?: No  Okay to leave a detailed message:  Yes

## 2021-06-04 NOTE — TELEPHONE ENCOUNTER
I am out of the office until Dec 23.  I will discuss her narcotic pain medication at our next visit.

## 2021-06-04 NOTE — TELEPHONE ENCOUNTER
Dr. Dawson's pt. Last visit here at [facility] with Dr. Lu on 8/29/2019.   Outpatient Medication Detail      Disp Refills Start End KRYS   viscous lidocaine HC (LIDOCAINE) 2 % Soln viscous solution 200 mL 2 12/3/2019  No   Sig: SWISH WITH 5 ML BY MOUTH EVERY 2 HOURS AS NEEDED   Sent to pharmacy as: lidocaine 2 % mucosal solution (lidocaine)   E-Prescribing Status: Receipt confirmed by pharmacy (12/3/2019  2:38 PM CST)   viscous lidocaine HC (LIDOCAINE) 2 % Soln viscous solution [[id number]]     Electronically signed by: Flakita Dawson MD on 12/03/19 1437 Status: Active   Ordering user: Flakita Dawson MD 12/03/19 1437 Authorized by: Flakita Dawson MD   Frequency:  12/03/19 - Until Discontinued Released by: Flakita Dawson MD 12/03/19 1437   Diagnoses  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

## 2021-06-04 NOTE — TELEPHONE ENCOUNTER
Patient Returning Call  Reason for call:  Patient returning call to check on the status of this request.  Information relayed to patient:  Pending providers review and approval.  Patient has additional questions:  Yes  If YES, what are your questions/concerns:  I am out of refills on this medication and am leaving for out of town. Can this be done today?  Okay to leave a detailed message?: Yes

## 2021-06-04 NOTE — TELEPHONE ENCOUNTER
Medication Question or Clarification  Who is calling: Patient  What medication are you calling about? (include dose and sig)   oxyCODONE-acetaminophen (PERCOCET)  mg per tablet           Summary: Take 1 every 6-8 hours as needed for severe pain, Print        Who prescribed the medication?: Flakita Dawson MD  What is your question/concern?: The patient is going to her rheumatology appointment she states today at 430 pm.  She is of the mindset this now will release her medication.  She does not want a partial fill she wants the 20 tabs.  Please advise.   Pharmacy: [facility]  Okay to leave a detailed message?: Yes [phone]    Site CMT - Please call the pharmacy to obtain any additional needed information.

## 2021-06-04 NOTE — TELEPHONE ENCOUNTER
Who is calling:  Patient   Reason for Call:  Patient has an appointment on 01.08.20 and is wondering if Dr. Dawson is able to see her sooner. She also wanted to inform PCP that she did go to see Dr. Burroughs.   Date of last appointment with primary care: 12.17.19  Okay to leave a detailed message: Yes

## 2021-06-04 NOTE — TELEPHONE ENCOUNTER
Patient Returning Call  Reason for call:  Return call  Information relayed to patient:  Patient was informed of the message below.  Patient has additional questions:  Yes  If YES, what are your questions/concerns:  Patient is questioning if Flakita Dawson MD can send a prescription for Percocet to get her through until she comes in? Patient stated that she would like a call back. Patient was transferred to scheduling.  Okay to leave a detailed message?: Yes   [phone]

## 2021-06-04 NOTE — TELEPHONE ENCOUNTER
Controlled Substance Refill Request  Medication Name:   Requested Prescriptions     Pending Prescriptions Disp Refills     oxyCODONE-acetaminophen (PERCOCET)  mg per tablet 10 tablet 0     Sig: Take 1 every 6-8 hours as needed for severe pain     Date Last Fill: 11/8/19  Pharmacy: [facility] # [id number]      Submit electronically to pharmacy  Controlled Substance Agreement Date Scanned:   Encounter-Level CSA Scan Date - 04/23/2018:    Scan on 4/25/2018 11:57 AM           Encounter-Level CSA Scan Date - 12/05/2016:    Scan on 12/5/2016     Patient requests please that staff call patient once the refill has been sent in .  Last office visit with prescriber/PCP: 8/7/2019 Flakita Dawson MD OR same dept: Visit date not found OR same specialty: Visit date not found  Last physical: Visit date not found Last MTM visit: Visit date not found

## 2021-06-04 NOTE — TELEPHONE ENCOUNTER
Spoke with pt and informed her that Dr. Dawson is not in the clinic today but that we would forward her this message to address tomorrow when she is back in clinic.  Pt is ok with waiting until tomorrow to have this addressed.

## 2021-06-04 NOTE — TELEPHONE ENCOUNTER
She needs to keep her appointment with Rheumatology and her appointment with me then will consider continuing narcotic pain medicine if appropriate.  That was part of the plan of care to continue controlled substances and the contract she signed.  I will reevaluate at her visit with me.

## 2021-06-04 NOTE — PATIENT INSTRUCTIONS - HE
Summary of Your Rheumatology Visit    Next Appointment:  4 Months,  sooner if necessary.      Medications:    Please follow directives on pill bottle on how to take medication(s) provided.      Referrals:    Spine clinic    Tests:     Please have labs this week (prior to starting prednisone) and 1 week prior to next visit.        Injections:        Other:

## 2021-06-04 NOTE — PROGRESS NOTES
"Suad Gongora who presents today with a chief complaint of  Follow-up      Joint Pains: Yes  Location: LT hip and low back   Onset: chronic 15 years   Intensity: 8 /10  AM Stiffness: yes, 0 Minutes  Alleviating/Aggravating Factors: Yes Medications helpful  Tolerating Meds: Yes tolerate med.   Other:      ROS:  Patient denies having any active: +chest pain off/on, shortness of breath, cough, abdominal pain,+ nausea on/off for year, vomiting, rashes, documented fevers, oral ulcers and recent infections. +Patient admits to having a good appetite.  Information gathered by medical assistant incorporated into this note, was reviewed and discussed with the patient.    Problem List:  Patient Active Problem List   Diagnosis     Skin: A Rash     Vitamin D Deficiency     Iron deficiency anemia     Recurrent Aphthous Ulcer     Depression     Herpes Simplex     Abdominal pain     Elevated antinuclear antibody (ELDA) level     Lupus (H)        PMH:   No past medical history on file.    Surgical History:  No past surgical history on file.    Family History:  No family history on file.    Social History:   reports that she quit smoking about 2 years ago. She has never used smokeless tobacco.    Allergies:  Allergies   Allergen Reactions     Gluten Other (See Comments)        Current Medications:  Current Outpatient Medications   Medication Sig Dispense Refill     ACYCLOVIR ORAL Take 50 mg by mouth 2 (two) times a day.       ferrous gluconate (FERGON) 324 MG tablet Take 1 po BID 60 tablet 11     oxyCODONE-acetaminophen (PERCOCET)  mg per tablet Take 1 every 6-8 hours as needed for severe pain 10 tablet 0     viscous lidocaine HC (LIDOCAINE) 2 % Soln viscous solution SWISH WITH 5 ML BY MOUTH EVERY 2 HOURS AS NEEDED 200 mL 2     No current facility-administered medications for this visit.            Physical Exam:  /70   Pulse 84   Ht 5' 6\" (1.676 m)   Wt 143 lb 3.2 oz (65 kg)   BMI 23.11 kg/m    General: A & O x 3 in " NAD  HEENT: EOMI, Non injected/non icteric sclera, no oral lesions noted, few isolated aphthous ulcers noted involving palate, left side of tongue and mucosa of inner lip  Dermatology: Chronic hyperpigmented macular skin lesions noted involving upper extremities.  CV: s1s2 with RRR, no rubs appreciated   Lungs: CTA B/L, no wheezing , rales or rhonci appreciated  GI: Soft, NT/ND, no rebound, no guarding noted  MS: Positive discomfort involving left trochanteric bursa region on passive range of motion testing of left hip and with focal palpation.  Straight leg raising on the right reproduced left-sided low back pain, nonradiating.  Otherwise patient demonstrated good passive/active ROM over other joints with no warmth, erythema, tenderness or synovitis noted over these joints.    Summary/Assessment:      History that includes recurrent aphthous ulcers and back pains.     Patient last seen July 2018, has been noncompliant with follow-up recommendations.    Presents stating that she been experiencing some resurfacing aphthous ulcers however not as significant compared to prior outbreak which was managed with combination of acyclovir and prednisone.  Patient was also started on Plaquenil which she ran out of with time.  Patient does not recall if Plaquenil was helpful, is willing to try again.    Over the past 1 to 2 months has been on acyclovir which has been beneficial for her aphthous lesions however, as mentioned still has some residual lesions.    Complains of having chronic low back pain, is unsure if she pursued referral to spine clinic provided on past visit.  Occasionally will have associated left lower extremity radicular pains.    Also complains of having left lateral hip pain likely secondary to trochanteric bursitis.    Receives Percocet from PCP, states only takes limited amount.    Does not recall trying a muscle relaxer for her current back pains.    Did not pursue recommendation/referral to nephrology  "for microalbuminuria.    No clear signs of synovitis noted on exam today.    Please see below for management plan.      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    Also in the differential is herpes 1 and 2 given positive antibodies (both IgM and IgG when having active flare on last visit).      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    Onset of oral sores around 2003.          Pertinent rheumatology/past medical history (please refer to above for more detailed history):      Recurrent aphthous ulcers (possibly lupus versus UCTD and/or herpes related)    Oral/genital herpes (positive IgM/IgG herpes 1 and 2)    Skin lesions/rashes (see biopsy results below)    Microalbuminuria    Positive ELDA (weakly positive)    Chronic anemia (\"iron deficiency\")    Chronic low back pain, with occasional left radiculopathy    Chronic upper back pain    Sensations of leg weakness bilaterally     Smoking history (is cutting back)    Anxiety/depression    Rheumatology medications provided/suggested:    Prednisone taper  Plaquenil  Lidoderm patches  Flexeril      Pertinent medication from other providers or from otc (please refer to above for more detailed med list):    Oral lidocaine rinse  Percocet   Acyclovir  IUD    Pertinent medications already tried:     Acyclovir (\"worsening of oral ulcers\")      Pertinent lab history:    Weakly positive ELDA    Elevated ESR/CRP levels.    Microalbuminuria    Low hemoglobin/hematocrit    Positive herpes 1 and 2 IgG antibodies  Positive herpes IgM " antibody    Noted to have negative/unremarkable: ELDA related subsets, ACE level, ANCA, HLA-B27, cardiolipin antibodies, lupus anticoagulant.    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  Has family in the area.    States has already seen infectious disease and was informed to see rheumatology.      Plan:      For active aphthous ulcers despite acyclovir, will again provide prednisone taper this time starting at lower dose given last significant activity.  Will provide prednisone 20 mg daily decreasing to off over the course of 5 weeks.    We will add back Plaquenil 200 mg twice a day.  Made aware that she should follow-up with ophthalmology every 6 months to 1 year while on Plaquenil.    Has oral lidocaine rinse.    We will add Flexeril prior to bedtime for low back pains with some difficulty sleeping at night.      Will provide Lidoderm patches for left trochanteric bursitis and low back pains.  Made aware that if not covered by insurance she can try over-the-counter icy hot anesthetic patches instead.      Patient deferred trial of cortisone injections for left trochanteric bursa and/or low back pains.  Also deferred referral to PT.    Will again refer to spine clinic for low back pains.    Will obtain some labs today.  Made aware that we may again refer her to nephrology depending on urine microalbumin results.  Made aware to provide urine sample when not menstruating.  Also made aware to have labs performed prior to initiating prednisone.     Follow-up in 4 months, sooner..      Procedure note:     Spent 40 minutes with greater than half of this time spent with the patient going over differential diagnosis, prognosis, treatment plan, medication side effects and  answering questions.    This note was transcribed using Dragon voice recognition software as a result unintentional  grammatical errors or word substitutions may have occurred. Please contact our Rheumatology department if you need any clarification or if you have any related inquiries.    Major side effect profile of medications provided were discussed with the patient.      Jake Burroughs DO....................  12/17/2019   4:36 PM

## 2021-06-05 NOTE — TELEPHONE ENCOUNTER
Left message. Please confirm: Pt last refill was on 1/20/20, Qty: 20. Does she want to get this fill on February 6th? Writer did not specify the month on the message.

## 2021-06-05 NOTE — TELEPHONE ENCOUNTER
I called and informed of both MD msg below. Pt understood and will not take prednisone.     Per pt said she was off of her period for 2 days when she give the urine sample and wasn't sure if there were still blood in the urine. Pt will repeat urinalysis again.     Lab order placed and appt fariba in 6 week.     Please advise dx for Ophthalmology

## 2021-06-05 NOTE — TELEPHONE ENCOUNTER
RN cannot approve Refill Request    RN can NOT refill this medication med is not covered by policy/route to provider. Last office visit: Visit date not found Last Physical: Visit date not found Last MTM visit: Visit date not found Last visit same specialty: 1/20/2020 Flakita Dawson MD.  Next visit within 3 mo: Visit date not found  Next physical within 3 mo: Visit date not found      Jeaneth Galeana, [facility] Triage/Med Refill 1/22/2020    Requested Prescriptions   Pending Prescriptions Disp Refills     viscous lidocaine HC (LIDOCAINE) 2 % Soln viscous solution [Pharmacy Med Name: LIDOCAINE 2% VISC ORAL SOLUTION] 200 mL 2     Sig: SWISH WITH 5ML BY MOUTH EVERY 2 HOURS AS NEEDED       There is no refill protocol information for this order

## 2021-06-05 NOTE — TELEPHONE ENCOUNTER
Patient Returning Call  Reason for call:  Patient called back.  Information relayed to patient:  Informed that this medication has been sent to her pharmacy.  Patient has additional questions:  No  If YES, what are your questions/concerns:    Okay to leave a detailed message?: No call back needed

## 2021-06-05 NOTE — TELEPHONE ENCOUNTER
Left message to call back for: Pt  Information to relay to patient:  Please call Suad and tell her she can start on the Plaquenil instead of the prednisone.  It looks like this was called into her pharmacy after she saw rheumatology so the pharmacy should be able to fill it for her.     Flakita Dawson

## 2021-06-05 NOTE — TELEPHONE ENCOUNTER
Pt called back and I relayed MD msg below to pt. I informed pt that rx was already to pharmacy on 12/17/19, pt need to call pharmacy to fill the med. Pt verbally understood and will call pharmacy.     Per pt said it is prednisone and plaquenil.    For active aphthous ulcers despite acyclovir, will again provide prednisone taper this time starting at lower dose given last significant activity.  Will provide prednisone 20 mg daily decreasing to off over the course of 5 weeks.    Should pt start on prednisone too?

## 2021-06-05 NOTE — TELEPHONE ENCOUNTER
"Recommend seeing ophthalmology within 3 to 6 months of being on Plaquenil, can associate with diagnosis of \"high risk medication\" use   "

## 2021-06-05 NOTE — PROGRESS NOTES
"SUBJECTIVE: Suad Gongora is a 39 y.o. female with:  Chief Complaint   Patient presents with     Follow-up     Medication Refill    She did see rheumatology in December to evaluate her chronic mouth ulcers.  It was recommended that she go on a 5 week taper of prednisone then start on Plaquenil. She was supposed to do lab work/ urine before starting prednisone but she never returned until now.  She is interested in taking the medication and agrees to do the lab work today.      She gets two different kinds of mouth ulcers.  She gets sores from herpes and also other mouth / oral sores that are more of a burning type of pain and she thinks these are related to her lupus diagnosis.  Her last oral outbreak was in November.  She also has soreness on her elbows with a dry scaling rash.  Also has peeling and pain in her fingers. She has some left hip pain at times.    She has been using Percocet 10/325 mg occasionally for severe oral pain.  She was on a controlled substance contract but did not come back at designated f/u so has not had rx since November.  She would like to resume her contract since the Percocet does help her be functional when she is having a painful outbreak.    Her social situation is much better.  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  Lives in [place] with her 4 children.  She feels she can focus more time on her own health this year.  She is overdue for a physical. She is trying to eat better.  Still tries to avoid gluten.      OBJECTIVE: /68 (Patient Site: Left Arm, Patient Position: Sitting, Cuff Size: Adult Regular)   Pulse 89   Ht 5' 6\" (1.676 m)   Wt 143 lb (64.9 kg)   SpO2 100%   BMI 23.08 kg/m   no distress    Suad was seen today for follow-up and medication refill.    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  Dr. Burroughs to review blood work and approve prednisone taper/ " Plaquenil.  She will need to f/u with him in the future.  -     oxyCODONE-acetaminophen (PERCOCET)  mg per tablet; Take 1 every 6-8 hours as needed for severe pain  -     Urinalysis-UC if Indicated  -     Microalbumin, Random Urine  -     HM2(CBC w/o Differential)  -     Creatinine  -     ALT (SGPT)  -     AST (SGOT)  -     Albumin  -     DNA (ds) Antibody Screen  -     Complement, C'3  -     Complement, C'4  -     Erythrocyte Sedimentation Rate  -     C-Reactive Protein  -     Culture, Urine    Recurrent Aphthous Ulcer- Continue use of acylovir when suspicious of HSV outbreak.    Chronic, continuous use of opioids - She did sign a controlled substance contract today for #20 Percocet tabs / month.  She needs to f/u with me every 3 months and also keep her f/u visits with rheumatology.    Consider a more comprehensive elimination diet in future.     Flakita Dawson

## 2021-06-05 NOTE — TELEPHONE ENCOUNTER
Notes recorded by Jake Burroughs DO on 1/21/2020 at 7:37 PM CST  ESR and CRP levels are nonspecific inflammatory markers, which need to be correlated clinically. ESR/sedimentation rate noted to be mildly elevated (not new and improved compared to prior level), while CRP/C-reactive protein returned within normal limits.     Significant improvement noted involving urine microalbumin level, currently within normal limits.    Noted to have stable anemia.  Recommend periodic monitoring.      Regarding urinalysis, given moderate blood with microscopic red blood cells noted, please inquire if patient was menstruating when sample provided.  If yes, recommend repeating when not menstruating.  If not, recommend patient see a urologist and place order.    Urine culture was negative for growth.    Remainder of lab results were stable.    Message received from PCP stating that she currently does not have any active aphthous ulcers.  Also informed that she did not start prednisone and Plaquenil as prescribed on previous visit.  Since no aphthous ulcers at this time, she can just start Plaquenil 200 mg twice a day as prescribed and hold off on starting prednisone.  Follow-up with ophthalmology within 3 to 6 months of starting Plaquenil, if needs referral, please place order.    Check labs (CBC with differential, creatinine and liver enzymes about 6 weeks after starting Plaquenil, if stable every 4 to 6 months thereafter), please place standing order

## 2021-06-05 NOTE — TELEPHONE ENCOUNTER
Who is calling:  pt  Reason for Call:  Pt wants to make sure that her Oxy prescription is at the Pharmacy on the 6th.  She works late and wants to pick it up that morning  Okay to leave a detailed message: Yes

## 2021-06-05 NOTE — TELEPHONE ENCOUNTER
----- Message from Jake Burroughs DO sent at 1/21/2020  7:06 PM CST -----  Regarding: RE: Follow-up  Thank you for the update.  If no active ulcers at this time, then patient can just start Plaquenil as prescribed.      Jake Burroughs     ----- Message -----  From: Flakita Dawson MD  Sent: 1/21/2020   4:59 PM CST  To: Jake Burroughs DO  Subject: Follow-up                                        I finally saw pt in f/u.  She went for the labs/ urine you ordered.  She never started prednisone/ plaquenil but she is willing to take both meds.  No active ulcers now- can you have your staff reach out to her regarding which med you would like her to start?    Thanks,  Flakita Dawson

## 2021-06-05 NOTE — TELEPHONE ENCOUNTER
----- Message from Flakita Dawson MD sent at 1/22/2020  8:37 PM CST -----  Regarding: FW: Follow-up  Please call Suad and tell her she can start on the Plaquenil instead of the prednisone.  It looks like this was called into her pharmacy after she saw rheumatology so the pharmacy should be able to fill it for her.    Flakita Dawson   ----- Message -----  From: Jake Burroughs DO  Sent: 1/21/2020   7:06 PM CST  To: Flakita Dawson MD, Natasha De La Rosa RN, #  Subject: RE: Follow-up                                    Thank you for the update.  If no active ulcers at this time, then patient can just start Plaquenil as prescribed.      Jake Burroughs     ----- Message -----  From: Flakita Dawson MD  Sent: 1/21/2020   4:59 PM CST  To: Jake Burroughs DO  Subject: Follow-up                                        I finally saw pt in f/u.  She went for the labs/ urine you ordered.  She never started prednisone/ plaquenil but she is willing to take both meds.  No active ulcers now- can you have your staff reach out to her regarding which med you would like her to start?    Thanks,  Flakita Dawson

## 2021-06-05 NOTE — TELEPHONE ENCOUNTER
Who is calling:  Patient   Reason for Call:  Calling back to confirm apt and states she will wait until Monday for her request . There were 2 apts scheduled for this patient, thus the confusion .   Date of last appointment with primary care: NA  Okay to leave a detailed message: Yes

## 2021-06-05 NOTE — TELEPHONE ENCOUNTER
Controlled Substance Refill Request  Medication Name:   Requested Prescriptions     Pending Prescriptions Disp Refills     oxyCODONE-acetaminophen (PERCOCET)  mg per tablet 20 tablet 0     Sig: Take 1 every 6-8 hours as needed for severe pain     Date Last Fill: 1/20/20.  5 day supply given  Is patient out of medication?:  Yes  Patient notified refills processed within 3 business days:  Yes  Requested [facility]: [facility]  Submit electronically to pharmacy  Controlled Substance Agreement on file:   Encounter-Level CSA Scan Date - 04/23/2018:    Scan on 4/25/2018 11:57 AM           Encounter-Level CSA Scan Date - 12/05/2016:    Scan on 12/5/2016        Last office visit:  1/20/20.  She is calling today so medication can be ready 2/6/20.    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

## 2021-06-05 NOTE — TELEPHONE ENCOUNTER
Per patient's PCP there were no aphthous ulcers, therefore no need to take prednisone at this time.    I also left a separate message for Natasha to contact patient, associated with lab results.

## 2021-06-05 NOTE — TELEPHONE ENCOUNTER
Called and spoke pt's son. Per son said pt left phone at home and pt will be back home like about 1pm.     I informed son that I will back later. Calling regard to both msg below.

## 2021-06-05 NOTE — TELEPHONE ENCOUNTER
Who is calling:  Patient   Reason for Call:  Patient wants this filled on February 6th so she an pick it up right after work on Thursday February 6, 2020  Date of last appointment with primary care: n/a  Okay to leave a detailed message: Yes

## 2021-06-05 NOTE — TELEPHONE ENCOUNTER
Patient Returning Call  Reason for call:  Patient called back.  Information relayed to patient:  Informed that this medication has been sent to her pharmacy on 2/3/2020.  Has no more questions.  Patient has additional questions:  No  If YES, what are your questions/concerns:    Okay to leave a detailed message?: No call back needed

## 2021-06-05 NOTE — TELEPHONE ENCOUNTER
Patient is requesting a refill of percocet until she meets with Dr. Burroughs. Patient states she just saw him but also plans to meet with him in February? I'm very confused but she is requesting a refill.    Patient also states she has super painful swelling in knees and elbows.    If you have any questions, feel free to reach out to patient. She does have an appointment scheduled next week with PCP.

## 2021-06-06 NOTE — TELEPHONE ENCOUNTER
Medication:   Disp Refills Start End KRYS   oxyCODONE-acetaminophen (PERCOCET)  mg per tablet 10 tablet 0 2/3/2020  No   Sig: Take 1 every 6-8 hours as needed for severe pain         Pharmacy:[id number] - [place], [place] - [address] [place] [address] AT [facility] [address]    Last Office Visit:1/20/20

## 2021-06-06 NOTE — TELEPHONE ENCOUNTER
Controlled Substance Refill Request  Medication Name:   Requested Prescriptions     Pending Prescriptions Disp Refills     oxyCODONE-acetaminophen (PERCOCET)  mg per tablet 10 tablet 0     Sig: Take 1 every 6-8 hours as needed for severe pain     Date Last Fill: 02/03/20  Is patient out of medication?:  Yes  Patient notified refills processed within 3 business days: No  Requested [facility]acy: [facility]  Submit electronically to pharmacy  Controlled Substance Agreement on file:   Encounter-Level CSA Scan Date - 04/23/2018:    Scan on 4/25/2018 11:57 AM           Encounter-Level CSA Scan Date - 12/05/2016:    Scan on 12/5/2016        Last office visit:  01/20/20

## 2021-06-06 NOTE — TELEPHONE ENCOUNTER
Controlled Substance Refill Request  Medication Name:   Requested Prescriptions     Pending Prescriptions Disp Refills     oxyCODONE-acetaminophen (PERCOCET)  mg per tablet 20 tablet 0     Sig: Take 1 every 6-8 hours as needed for severe pain     Date Last Fill: 02/21/20  Is patient out of medication?: No  Patient notified refills processed within 3 business days:  Yes  Requested Pharmacy: [facility]  Submit electronically to pharmacy  Controlled Substance Agreement on file:   Encounter-Level CSA Scan Date - 04/23/2018:    Scan on 4/25/2018 11:57 AM           Encounter-Level CSA Scan Date - 12/05/2016:    Scan on 12/5/2016        Last office visit:  01/20/2020

## 2021-06-07 NOTE — TELEPHONE ENCOUNTER
Who is requesting the letter?    Patient    Why is the letter needed?   Needs extension for leave of absence 04/01/2020- 07/01/2020  History of Lupus.    How would you like this letter returned? My Chart    Okay to leave a detailed message?   Yes

## 2021-06-07 NOTE — PROGRESS NOTES
"Suad Gongora is a 39 y.o. female who is being evaluated via a billable telephone visit.      The patient has been notified of following:     \"This telephone visit will be conducted via a call between you and your physician/provider. We have found that certain health care needs can be provided without the need for a physical exam.  This service lets us provide the care you need with a short phone conversation.  If a prescription is necessary we can send it directly to your pharmacy.  If lab work is needed we can place an order for that and you can then stop by our lab to have the test done at a later time.    Telephone visits are billed at different rates depending on your insurance coverage. During this emergency period, for some insurers they may be billed the same as an in-person visit.  Please reach out to your insurance provider with any questions.    If during the course of the call the physician/provider feels a telephone visit is not appropriate, you will not be charged for this service.\"    Patient has given verbal consent to a Telephone visit? Yes    Patient would like to receive their AVS by AVS Preference: Julian.    SUBJECTIVE: Suad Gongora is a 39 y.o. female with:    1) Chronic mouth sores -  She still has soreness in her gums/ mouth / jaw but not getting sores on her lips as she was before.  Still feels she needs oxycodone PRN pain.  No side effects with medicine and she is taking it appropriately. She has cut out a lot of wheat but not gone completely gluten free.  This did help in the past.  She is home now and thinks she can do a gluten-free diet.    2) SLE - She is taking Plaquenil.  Has not had an eye exam and she is due for blood work.  This was prescribed by rheumatology.  She is still getting some patches of rash on her extremities. She states she has had some changes in vision - colors look different to her and she has also had photophobia.  No pain in eyes or other issues.    3) She had " norovirus infection in Feb and had to be off work one week. 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  She is worried about risk of COVID 19 exposure with her history of autoimmune disease/ taking immunosuppressant drug.    SH: Single mother.  Has several children at home.  Works as MA.      Assessment/Plan:  1. Visual changes  Will report her symptoms to [facility] and see when they feel she needs to be seen.  - Ambulatory referral to Ophthalmology    2. Recurrent Aphthous Ulcer  Improved.  Continue oxycodone PRN for now.  I agree with a trial off gluten completely.    3. Undifferentiated connective tissue disease (H)  Continue plaquenil and make appointment for lab work    F/U in 3 months    Phone call duration:  13 minutes    JAZMINE Pompa

## 2021-06-07 NOTE — TELEPHONE ENCOUNTER
Who is requesting the letter?  The patient   Why is the letter needed? The patient needs to take time off from work at this time of the Covid-19.The patient states she has Lupus and takes public transportation which she doesn't need the exposure.  How would you like this letter returned? My chart  Okay to leave a detailed message? Yes

## 2021-06-07 NOTE — TELEPHONE ENCOUNTER
Patient Returning Call    Reason for call:  lmtcb    Information relayed to patient:  Patient informed of message and states that RTW should be 04/16/2020.     states she want's to quit and want's to collect benefits before making the final decision.    Patient has additional questions:  Yes  If YES, what are your questions/concerns:    Please complete work note and send to My Chart.    Okay to leave a detailed message?: Yes

## 2021-06-07 NOTE — TELEPHONE ENCOUNTER
Controlled Substance Refill Request  Medication Name:   Requested Prescriptions     Pending Prescriptions Disp Refills     oxyCODONE-acetaminophen (PERCOCET)  mg per tablet 20 tablet 0     Sig: Take 1 every 6-8 hours as needed for severe pain     Date Last Fill: 03/20/2020  Requested Pharmacy: [facility] #[id number] - [place], [place] - [address] [place] [address] [address] AT [facility] [address]   Submit electronically to pharmacy  Controlled Substance Agreement on file:   Encounter-Level CSA Scan Date - 04/23/2018:    Scan on 4/25/2018 11:57 AM           Encounter-Level CSA Scan Date - 12/05/2016:    Scan on 12/5/2016        Last office visit:  01/02/2020

## 2021-06-07 NOTE — TELEPHONE ENCOUNTER
RN cannot approve Refill Request    RN can NOT refill this medication medication not on med list. Last office visit: 1/20/2020 Flakita Dawson MD Last Physical: Visit date not found Last MTM visit: Visit date not found Last visit same specialty: 1/20/2020 Flakita Dawson MD.  Next visit within 3 mo: Visit date not found  Next physical within 3 mo: Visit date not found      [facility] Triage/Med Refill 3/20/2020    Requested Prescriptions   Pending Prescriptions Disp Refills     traZODone (DESYREL) 50 MG tablet [Pharmacy Med Name: TRAZODONE 50MG TABLETS] 30 tablet 0     Sig: TAKE 1 TABLET(50 MG) BY MOUTH AT BEDTIME       Tricyclics/Misc Antidepressant/Antianxiety Meds Refill Protocol Passed - 3/20/2020  3:17 AM        Passed - PCP or prescribing provider visit in last year     Last office visit with prescriber/PCP: 1/20/2020 Flakita Dawson MD OR same dept: 1/20/2020 Flakita Dawson MD OR same specialty: 1/20/2020 Flakita Dawson MD  Last physical: Visit date not found Last MTM visit: Visit date not found   Next visit within 3 mo: Visit date not found  Next physical within 3 mo: Visit date not found  Prescriber OR PCP: Flakita Dawson MD  Last diagnosis associated with med order: 1. Insomnia, unspecified type  - traZODone (DESYREL) 50 MG tablet [Pharmacy Med Name: TRAZODONE 50MG TABLETS]; TAKE 1 TABLET(50 MG) BY MOUTH AT BEDTIME  Dispense: 30 tablet; Refill: 0    If protocol passes may refill for 12 months if within 3 months of last provider visit (or a total of 15 months).

## 2021-06-07 NOTE — TELEPHONE ENCOUNTER
Please see if she is taking the Plaquenil as prescribed by rheumatology since she would be at higher risk since the medicine lowers immune system but if not taking medicine having Lupus means the immune system is overactive rather than suppressed so would not be at any further risk than average person. Please see if pt taking med.  Please let me her know I recommend she take vitamin C 1000 mg two times a day daily.

## 2021-06-07 NOTE — TELEPHONE ENCOUNTER
Left message to call back for: Pt  Information to relay to patient:  Please relay MD's message to pt

## 2021-06-07 NOTE — TELEPHONE ENCOUNTER
PEPE: Spoke with pt she send you a my-chart regarding need you to rewrite letter. Please see my-chart message. Thanks!

## 2021-06-07 NOTE — TELEPHONE ENCOUNTER
RN cannot approve Refill Request    RN can NOT refill this medication med is not covered by policy/route to provider     . Last office visit: 1/20/2020 Flakita Dawson MD Last Physical: Visit date not found Last MTM visit: Visit date not found Last visit same specialty: 1/20/2020 Flakita Dawson MD.  Next visit within 3 mo: Visit date not found  Next physical within 3 mo: Visit date not found      Anni Byrne, [facility] Triage/Med Refill 4/21/2020    Requested Prescriptions   Pending Prescriptions Disp Refills     viscous lidocaine HC (LIDOCAINE) 2 % Soln viscous solution [Pharmacy Med Name: LIDOCAINE 2% VISC ORAL SOLUTION] 200 mL 2     Sig: SWISH WITH 5 ML BY MOUTH EVERY 2 HOURS AS NEEDED       There is no refill protocol information for this order

## 2021-06-07 NOTE — TELEPHONE ENCOUNTER
Call pt -I will write letter but I am not sure how long to have her stay out of work.  Does she have any ideas?

## 2021-06-07 NOTE — PROGRESS NOTES
4-17-20  Hello just connecting back, pt was left a message to call [facility] to schedule, pt did not answer phone when we attempted to call pt  Thanks  Braeden

## 2021-06-07 NOTE — TELEPHONE ENCOUNTER
Spoke with pharmacy. They received refill request on 3/18 and just got a hold of pt's insurance and will get it for pt to pick it up. Everything is okay now. Please delete refill request. xl

## 2021-06-07 NOTE — TELEPHONE ENCOUNTER
Refill request received from pharmacy.  Message from pharmacy:  Plan limitations max of 7 day supply.  Please call plan at [phone] to initiate prior authorization or call/fax pharmacy to change medication.  Patient ID # is [id number].

## 2021-06-08 NOTE — TELEPHONE ENCOUNTER
Medication: oxycodone-acetaminophen (percocet)  Pharmacy: [facility] in [place] on [address]  Last OV: 4/16/20 virtual visit with Dr. Dawson  Last refill: 4/16/20, #20 no refills authorized by Dr. Samir Jose, [facility]

## 2021-06-08 NOTE — TELEPHONE ENCOUNTER
Patient states she is out of medication requesting to process as soon as possible .  Controlled Substance Refill Request  Medication Name:   Requested Prescriptions     Pending Prescriptions Disp Refills     oxyCODONE-acetaminophen (PERCOCET)  mg per tablet 20 tablet 0     Sig: Take 1 every 6-8 hours as needed for severe pain   Date Last Fill: 04/16/20  Is patient out of medication?:  Yes  Patient notified refills processed within 3 business days:  Yes  Requested Pharmacy: [id number]  Submit electronically to pharmacy  Controlled Substance Agreement on file:   Encounter-Level CSA Scan Date - 04/23/2018:    Scan on 4/25/2018 11:57 AM           Encounter-Level CSA Scan Date - 12/05/2016:    Scan on 12/5/2016        Last office visit:  04/16/20

## 2021-06-08 NOTE — TELEPHONE ENCOUNTER
RN cannot approve Refill Request    RN can NOT refill this medication med is not covered by policy/route to provider. Last office visit: 1/20/2020 Flakita Dawson MD Last Physical: Visit date not found Last MTM visit: Visit date not found Last visit same specialty: 1/20/2020 Flakita Dawson MD.  Next visit within 3 mo: Visit date not found  Next physical within 3 mo: Visit date not found      Ashley Up, [facility] Triage/Med Refill 5/30/2020    Requested Prescriptions   Pending Prescriptions Disp Refills     viscous lidocaine HC (LIDOCAINE) 2 % Soln viscous solution [Pharmacy Med Name: LIDOCAINE 2% VISC ORAL SOLUTION] 200 mL 2     Sig: SWISH WITH 5 ML BY MOUTH EVERY 2 HOURS AS NEEDED       There is no refill protocol information for this order

## 2021-06-09 NOTE — TELEPHONE ENCOUNTER
FYI - Status Update  Who is Calling: Patient  Update: Patient was calling to get in her refill for Percocet early but the patient was informed a request could be sent but it may denied because she is requesting it too early.    Patient stated she will wait on requesting the refill then. Patient asked if she is due for an appointment. Patient was informed she is due for a 3 month medication check that can be done virtually. Patient was transferred to scheduling to make an appointment.   Okay to leave a detailed message?:  No return call needed

## 2021-06-09 NOTE — TELEPHONE ENCOUNTER
Controlled Substance Refill Request  Medication Name:   Requested Prescriptions     Pending Prescriptions Disp Refills     oxyCODONE-acetaminophen (PERCOCET)  mg per tablet 20 tablet 0     Sig: Take 1 every 6-8 hours as needed for severe pain     viscous lidocaine HC (LIDOCAINE) 2 % Soln viscous solution 200 mL 2     Sig: SWISH WITH 5 ML BY MOUTH EVERY 2 HOURS AS NEEDED     Date Last Fill: 06/01/20  Requested Pharmacy: [facility]  Submit electronically to pharmacy  Controlled Substance Agreement on file:   Encounter-Level CSA Scan Date - 04/23/2018:    Scan on 4/25/2018 11:57 AM           Encounter-Level CSA Scan Date - 12/05/2016:    Scan on 12/5/2016        Last office visit:

## 2021-06-09 NOTE — TELEPHONE ENCOUNTER
Last visit: 4/16/20    Last refill of Percocet: 6/3/20    Last refill of Viscous lidocaine HC 2%: 6/1/20 with 2 additional refills

## 2021-06-09 NOTE — TELEPHONE ENCOUNTER
No show 7/2/2020 1:23, 1:40 ,1:57 lvm no answer  6/25/2020 2:57 lvm change appt to video  F/u     Left message patient can give us a call back to reschedule

## 2021-06-09 NOTE — PROGRESS NOTES
"Suad Gongora is a 39 y.o. female who is being evaluated via a billable telephone visit.      The patient has been notified of following:     \"This telephone visit will be conducted via a call between you and your physician/provider. We have found that certain health care needs can be provided without the need for a physical exam.  This service lets us provide the care you need with a short phone conversation.  If a prescription is necessary we can send it directly to your pharmacy.  If lab work is needed we can place an order for that and you can then stop by our lab to have the test done at a later time.    Telephone visits are billed at different rates depending on your insurance coverage. During this emergency period, for some insurers they may be billed the same as an in-person visit.  Please reach out to your insurance provider with any questions.    If during the course of the call the physician/provider feels a telephone visit is not appropriate, you will not be charged for this service.\"    Patient has given verbal consent to a Telephone visit? Yes    What phone number would you like to be contacted at? [phone]    Patient would like to receive their AVS by AVS Preference: Julian.    Additional provider notes: no     SUBJECTIVE: Suad Gongora is a 39 y.o. female : She has h/o SLE/ recurrent aphthous ulcers:    She has been under more stress with family.  Had bad outbreak couple of ulcers in her mouth a few weeks ago but now just has soreness in her tongue.  She is taking Plaquenil and it has been helpful.  She is taking cod liver oil and vitamin C.  Following a gluten -free diet mostly.  She is going to schedule an eye appointment.  She has f/u visit with Dr. Burroughs coming up and is due for lab work on her Plaquenil.    Iron deficiency anemia - She does have heavy periods.  She has Paraguard IUD.  Currently not taking any iron.    SH: Single with children.  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      Assessment/Plan:  1. Iron deficiency anemia due to chronic blood loss  Recheck hemoglobin ( has pending labs).  Start on iron.  I recommend consult with midwives to discuss switching her IUD to progesterone IUD.  - ferrous sulfate 325 (65 FE) MG tablet; Take 1 tablet (325 mg total) by mouth daily with breakfast.  Dispense: 90 tablet; Refill: 3    2. Recurrent Aphthous Ulcer  Stable.  Continue PRN oxycodone as needed for flare-ups.    3. 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  Get labs done and f/u with rheumatology.  Also needs eye exam.    F/U in 3 months        Phone call duration:  13 minutes    JAZMINE Pompa

## 2021-06-10 NOTE — TELEPHONE ENCOUNTER
RN cannot approve Refill Request    RN can NOT refill this medication med is not covered by policy/route to provider     . Last office visit: 1/20/2020 Flakita Dawson MD Last Physical: Visit date not found Last MTM visit: Visit date not found Last visit same specialty: 1/20/2020 Flakita Dawson MD.  Next visit within 3 mo: Visit date not found  Next physical within 3 mo: Visit date not found      Anni Byrne, [facility] Triage/Med Refill 7/28/2020    Requested Prescriptions   Pending Prescriptions Disp Refills     viscous lidocaine HC (LIDOCAINE) 2 % Soln viscous solution 200 mL 2     Sig: SWISH WITH 5 ML BY MOUTH EVERY 2 HOURS AS NEEDED       There is no refill protocol information for this order

## 2021-06-10 NOTE — TELEPHONE ENCOUNTER
Refill Approved    Rx renewed per Medication Renewal Policy. Medication was last renewed on historical medication.    Mallika Villa, [facility] Triage/Med Refill 7/30/2020     Requested Prescriptions   Pending Prescriptions Disp Refills     valACYclovir (VALTREX) 1000 MG tablet [Pharmacy Med Name: VALACYCLOVIR 1GM TABLETS] 21 tablet 5     Sig: TAKE 1 TABLET BY MOUTH THREE TIMES DAILY FOR 7 DAYS       Antivirals Refill Protocol Passed - 7/29/2020  3:19 AM        Passed - Renal function done in last year     Creatinine   Date Value Ref Range Status   01/20/2020 0.57 (L) 0.60 - 1.10 mg/dL Final             Passed - Visit with PCP or prescribing provider visit in past 12 months or next 3 months     Last office visit with prescriber/PCP: 1/20/2020 Flakita Dawson MD OR same dept: 1/20/2020 Flakita Dawson MD OR same specialty: 1/20/2020 Flakita Dawson MD  Last physical: Visit date not found Last MTM visit: Visit date not found   Next visit within 3 mo: Visit date not found  Next physical within 3 mo: Visit date not found  Prescriber OR PCP: Flakita Dawson MD  Last diagnosis associated with med order: 1. Herpes simplex virus infection  - valACYclovir (VALTREX) 1000 MG tablet [Pharmacy Med Name: VALACYCLOVIR 1GM TABLETS]; TAKE 1 TABLET BY MOUTH THREE TIMES DAILY FOR 7 DAYS  Dispense: 21 tablet; Refill: 5    If protocol passes may refill for 12 months if within 3 months of last provider visit (or a total of 15 months).             Passed - Patient does not have active pregnancy episode        Passed - Patient has not had positive pregnancy test in last 280 days     Beta hCG Qualitative   Date Value Ref Range Status   09/24/2015 Negative Negative Final     Pregnancy Test, Urine   Date Value Ref Range Status   11/13/2009 Negative  Final

## 2021-06-10 NOTE — TELEPHONE ENCOUNTER
Date: 11/18/2020 Status: [facility]   Time: 2:30 PM Length: 30   Visit Type: VIDEO VISIT RETURN [1706] Copay: $0.00   Provider: Jake Burroughs DO

## 2021-06-10 NOTE — PROGRESS NOTES
Suad Medinales who presents today with a chief complaint of  No chief complaint on file.      Joint Pains: Yes  Location:   Onset: have pain when sitting to down or need to reposition   Intensity: 4 /10  AM Stiffness: yes, couple second   Alleviating/Aggravating Factors: yes Medications helpful  Tolerating Meds: Yes tolerate med.   Other:      ROS:  Patient denies having any active: +chest pain on/off for couple months, shortness of breath, cough, abdominal pain,+ nausea sometime, vomiting, rashes, documented fevers, oral ulcers and recent infections.  +Patient admits to having a good appetite.  Information gathered by medical assistant incorporated into this note, was reviewed and discussed with the patient.    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    Surgical History:  No past surgical history on file.    Family History:  No family history on file.    Social History:   reports that she quit smoking about 3 years ago. She has never used smokeless tobacco.    Allergies:  Allergies   Allergen Reactions     Gluten Other (See Comments)        Current Medications:  Current Outpatient Medications   Medication Sig Dispense Refill     ACYCLOVIR ORAL Take 50 mg by mouth 2 (two) times a day.       cyclobenzaprine (FLEXERIL) 10 MG tablet Take 1 tab p.o. qhs (if feeling groggy the following morning, can try taking half a tablet instead or can take earlier the night before). 30 tablet 3     ferrous gluconate (FERGON) 324 MG tablet Take 1 po BID 60 tablet 11     ferrous sulfate 325 (65 FE) MG tablet Take 1 tablet (325 mg total) by mouth daily with breakfast. 90 tablet 3     hydroxychloroquine (PLAQUENIL) 200 mg tablet  Take 1 tablet p.o. twice daily. 60 tablet 3     lidocaine (LIDODERM) 5 % Apply 1/2 to 1 patch over left lateral hip and/or low back regions prn,  for up to 12 hrs. Then wait 12 hrs prior to applying another patch. 30 patch 3     oxyCODONE-acetaminophen (PERCOCET)  mg per tablet Take 1 every 6-8 hours as needed for severe pain 20 tablet 0     predniSONE (DELTASONE) 5 MG tablet Take 4 tab p.o. qd for 7 days then decrease by 1 tab q7 days until 5 mg daily then decrease to 1/2 tab (2.5mg) for 7 days to off.. 74 tablet 0     valACYclovir (VALTREX) 1000 MG tablet TAKE 1 TABLET BY MOUTH THREE TIMES DAILY FOR 7 DAYS 21 tablet 5     viscous lidocaine HC (LIDOCAINE) 2 % Soln viscous solution SWISH WITH 5 ML BY MOUTH EVERY 2 HOURS AS NEEDED 200 mL 2     No current facility-administered medications for this visit.            Physical Exam:  There were no vitals taken for this visit.    Following up today via video visit through [facility], per Covid-19 pandemic requirements.    Verbal consent has been obtained for this service by care team member.    Video call start time: 11:36 AM    Video call end time: 11:56 AM    Patient location for video visit: Home     Provider location for video visit:  Home (working remotely)      Summary/Assessment:      History that includes recurrent aphthous ulcers and back pains.     States has noticed benefit with Plaquenil 200 mg twice a day however has been more stress lately and resulted in resurfacing ulcers involving her mouth.  On video exam noted to have some ulcers involving lower lip, white plaque noted on tongue.     Also points to having skin lesions involving her elbows and knees, hyperpigmented lesions noted otherwise difficult to fully make out.    States in the past prednisone taper has been helpful for aphthous ulcer breakout.    Flexeril beneficial when taken for back pains and sleep.    Please see below for management plan.      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    Also in the differential is herpes 1 and 2 given positive antibodies (both IgM and IgG when having active flare on last visit).      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    Onset of oral sores around 2003.          Pertinent rheumatology/past medical history (please refer to above for more detailed history):      Recurrent aphthous ulcers (possibly lupus versus UCTD and/or herpes related)    Oral/genital herpes (positive IgM/IgG herpes 1 and 2)    Skin lesions/rashes (see biopsy results below)    Microalbuminuria    Positive ELDA (weakly positive)    Chronic anemia (\"iron deficiency\")    Chronic low back pain, with occasional left radiculopathy    Chronic upper back pain    Sensations of leg weakness bilaterally     Smoking history (is cutting back)    Anxiety/depression    Rheumatology medications provided/suggested:    Prednisone taper  Plaquenil  Lidoderm patches  Flexeril      Pertinent medication from other providers or from otc (please refer to above for more detailed med list):    Oral lidocaine rinse  Percocet   Acyclovir  IUD    Pertinent medications already tried:     Acyclovir (\"worsening of oral ulcers\")      Pertinent lab history:    Weakly positive ELDA    Elevated ESR/CRP levels.    Microalbuminuria    Low hemoglobin/hematocrit    Positive herpes 1 and 2 IgG antibodies  Positive herpes IgM antibody    Noted to have negative/unremarkable: ELDA related subsets, ACE level, ANCA, HLA-B27, cardiolipin antibodies, lupus anticoagulant.    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  Has family in the area.    States has already seen infectious disease and was informed to see rheumatology.      Plan:      For active aphthous ulcers. Will provide prednisone 20 mg daily decreasing to off over the course of 5 weeks.  Made aware if white plaque lesions over her tongue do not improve or if worsen while on prednisone taper then she should contact her PCP for possible treatment of thrush.  Patient expressed understanding and agreement.    Continue Plaquenil 200 mg twice a day.  Made aware that she should follow-up with ophthalmology every 6 months to 1 year while on Plaquenil.    We discussed possibly adding an additional immunosuppressive and how methotrexate versus Imuran by next visit.  Will obtain preparatory labs/x-rays and send patient handouts on these medications for her to review.    Has oral lidocaine rinse.    Continue Flexeril PRN  prior to bedtime for low back pains with some difficulty sleeping at night.      On prior visit provided Lidoderm patches for left trochanteric bursitis and low back pains.  Made aware that if not covered by insurance she can try over-the-counter icy hot anesthetic patches instead.      Desires to have topical steroid cream for skin lesions involving elbows and knees.  Made aware that this may improve with oral prednisone.  Desires to have regardless as pruritic, will provide triamcinolone 0.1% twice a day for 5 to 10 days.    On prior visit referred to spine clinic for low back pains.    Will obtain some labs today.  Made aware that we may again refer her to nephrology depending on urine microalbumin results.  Made aware to provide urine sample when not menstruating.  Also made aware to have labs performed prior to initiating prednisone.     States has not seen nephrology, has microscopic  hematuria.  Microalbuminuria significantly improved on last urine specimen, will recheck.  Made aware to perform when not menstruating.  States able to have labs performed prior to starting oral steroids.    Follow-up in 3 months, sooner if necessary.      Procedure note:         This note was transcribed using Dragon voice recognition software as a result unintentional grammatical errors or word substitutions may have occurred. Please contact our Rheumatology department if you need any clarification or if you have any related inquiries.    Major side effect profile of medications provided were discussed with the patient.      Jake Burroughs DO     ....................  7/31/2020   11:13 AM

## 2021-06-10 NOTE — TELEPHONE ENCOUNTER
Date: 8/12/2020 Status: [facility]   Time: 1:00 PM Length: 15   Visit Type: LAB [[id number]] Copay: $0.00   Provider: [facility]

## 2021-06-10 NOTE — TELEPHONE ENCOUNTER
Controlled Substance Refill Request  Medication Name:   Requested Prescriptions     Pending Prescriptions Disp Refills     oxyCODONE-acetaminophen (PERCOCET)  mg per tablet 20 tablet 0     Sig: Take 1 every 6-8 hours as needed for severe pain     Date Last Fill: 7/6/2020  [facility]  Submit electronically to pharmacy  Controlled Substance Agreement on file:   Encounter-Level CSA Scan Date - 04/23/2018:    Scan on 4/25/2018 11:57 AM           Encounter-Level CSA Scan Date - 12/05/2016:    Scan on 12/5/2016        Last office visit:  7/24/2020

## 2021-06-10 NOTE — TELEPHONE ENCOUNTER
Called and left VM to call back to [facility] a f/u appt in 3 months with .     Please [facility] as above when pr return call.

## 2021-06-10 NOTE — TELEPHONE ENCOUNTER
Refill Request  Did you contact pharmacy: Yes  Medication name:   Requested Prescriptions     Pending Prescriptions Disp Refills     viscous lidocaine HC (LIDOCAINE) 2 % Soln viscous solution 200 mL 2     Sig: SWISH WITH 5 ML BY MOUTH EVERY 2 HOURS AS NEEDED     Who prescribed the medication: Flakita Dawson MD   Requested [facility]acy[facility]  Is patient out of medication: Yes  Patient notified refills processed in 3 business days:  yes  Okay to leave a detailed message: yes  The patient states her mouth is full of painful sores which makes it difficult to talk. The patient declines triage.

## 2021-06-10 NOTE — PATIENT INSTRUCTIONS - HE
Summary of Your Rheumatology Visit    Next Appointment:   Months    Medications:      Referrals:      Tests:        Injections:        Other:

## 2021-06-11 NOTE — TELEPHONE ENCOUNTER
Controlled Substance Refill Request  Medication Name:   Requested Prescriptions     Pending Prescriptions Disp Refills     oxyCODONE-acetaminophen (PERCOCET)  mg per tablet 20 tablet 0     Sig: Take 1 every 6-8 hours as needed for severe pain     Date Last Fill: 8/4/2020  [facility]ed [facility]  Submit electronically to pharmacy  Controlled Substance Agreement on file:   Encounter-Level CSA Scan Date - 04/23/2018:    Scan on 4/25/2018 11:57 AM           Encounter-Level CSA Scan Date - 12/05/2016:    Scan on 12/5/2016        Last office visit:  7/24/2020

## 2021-06-11 NOTE — TELEPHONE ENCOUNTER
Patient Returning Call  Reason for call:  Carlos returning call to check on the status of this request.  Information relayed to patient:  Caller stated I already faxed this to [phone] as instructed.  I do not have access to another fax machine.  This form is for my unemployment and there is a deadline.  I need this done today please call me at [phone] to discuss this.  Patient has additional questions:  No  If YES, what are your questions/concerns:  none  Okay to leave a detailed message?: Yes

## 2021-06-11 NOTE — PROGRESS NOTES
Assessment & Plan   1. Herpetic gingivostomatitis: Unable to eat or drink for the last three days due to pain. She is lightheaded and hypotensive.  Advised ED visit for fluids. She is initially in agreement and call placed to covering provider, however before leaving she states she cannot afford to go to the ED and can manage this on her own.  Advised frequent sips of fluids with electrolytes.  If she is unable to take fluids by mouth she will need to be seen  - viscous lidocaine HC (LIDOCAINE) 2 % Soln viscous solution; RINSE AND GARGLE WITH 5 ML BY MOUTH EVERY 2 HOURS AS NEEDED  Dispense: 200 mL; Refill: 0  - oxyCODONE-acetaminophen (PERCOCET)  mg per tablet; Take 1 tablet by mouth every 6 (six) hours as needed for pain.  Dispense: 20 tablet; Refill: 0    Radhika Stoner CNP    Subjective   Chief Complaint:  Mouth Lesions (bad breakout since Sunday) and Medication Refill (lidocaine and percocet)    HPI:   Suad Gongora is a 36 y.o. female who presents for mouth lesions.      She is here today with a recurrence of chronic mouth and lip lesions.  Recent flare up began five days ago.  She was seen in [facility] but states at that time flare was fairly mild.  She was given Norco.  The following day lesions worsened, lips with swelling and bleeding.  She has been unable to eat or drink due to pain with opening mouth.  Is pouring spoonfuls of water into mouth a few times a day.  Urinated once in the last 24 hours.  States she feels lightheaded, weak.     She did take Valtrex at the onset of outbreak but has not noted improvement.  She requests lidocaine mouth rinse and Percocet for pain relief.     She was referred to ID by PCP though did not follow up with this referral.      PMH:   Patient Active Problem List   Diagnosis     Streptococcal Sore Throat     Sore Throat     Skin: A Rash     Vitamin D Deficiency     Iron Deficiency Anemia Secondary To Chronic Blood Loss     Recurrent Aphthous Ulcer     Depression      Herpes Simplex     Abdominal pain       No past medical history on file.    Current Medications:   Current Outpatient Prescriptions on File Prior to Visit   Medication Sig Dispense Refill     benzonatate (TESSALON PERLES) 100 MG capsule Take 1 capsule (100 mg total) by mouth every 6 (six) hours as needed for cough. 30 capsule 0     copper (PARAGARD T 380A) 380 square mm IUD IUD 1 each by Intrauterine route once. Placed 10/27/2016       dextromethorphan (DELSYM) 30 mg/5 mL liquid Take 1-2 tsp po BID PRN 89 mL 0     ferrous sulfate 324 mg (65 mg iron) TbEC        LYSINE ORAL Capsules       MULTIVITAMINS WITH IRON (MULTI-VITAMIN WITH IRON ORAL) Take 1 tablet by mouth daily. Tablets       prenatal vitamin iron-folic acid 27mg-0.8mg (PRENATAL S) 27 mg iron- 800 mcg Tab tablet TK ONE T D  0     valACYclovir (VALTREX) 1000 MG tablet TAKE 1 TABLET BY MOUTH THREE TIMES DAILY 21 tablet 10     viscous lidocaine HC (LIDOCAINE) 2 % Soln viscous solution RINSE AND GARGLE WITH 5 ML BY MOUTH EVERY 2 HOURS AS NEEDED 200 mL 0     [DISCONTINUED] oxyCODONE-acetaminophen (PERCOCET)  mg per tablet Take 1 tablet by mouth every 6 (six) hours as needed for pain. 20 tablet 0     [DISCONTINUED] viscous lidocaine HC (LIDOCAINE) 2 % Soln viscous solution RINSE AND GARGLE WITH 5 ML BY MOUTH EVERY 2 HOURS AS NEEDED 200 mL 0     No current facility-administered medications on file prior to visit.        Allergies:  has No Known Allergies.    SH/FH:  Social History and Family History reviewed and updated.   Tobacco Status:  She  reports that she quit smoking about 2 months ago. She does not have any smokeless tobacco history on file.    Review of Systems:  A complete head to toe ROS is negative unless otherwise noted in HPI    Objective     Vitals:    07/12/17 1143   BP: 92/62   Patient Site: Left Arm   Patient Position: Lying   Cuff Size: Adult Regular   Pulse: 64   Weight: 119 lb (54 kg)     Wt Readings from Last 3 Encounters:   07/12/17  119 lb (54 kg)   12/05/16 150 lb (68 kg)   02/10/16 126 lb (57.2 kg)       Physical Exam:  GENERAL: Alert, appears fatigued and weak  SKIN: Normal turgor  HEAD: Normocephalic, atraumatic  MOUTH: Ulcerative lesions on lips, edematous and bleeding.  Ulcerative lesions on tongue, buccal mucosa.  No tonsillar enlargement.    NECK: No lymphadenopathy.   CV: Regular rate and rhythm without murmurs, rubs or gallops.  RESP: Lung sounds clear, symmetric excursion.     Labs:    No results found for this or any previous visit (from the past 168 hour(s)).

## 2021-06-12 NOTE — TELEPHONE ENCOUNTER
RN cannot approve Refill Request    RN can NOT refill this medication med is not covered by policy/route to provider. Last office visit: Visit date not found Last Physical: Visit date not found Last MTM visit: Visit date not found Last visit same specialty: 1/20/2020 Flakita Dawson MD.  Next visit within 3 mo: Visit date not found  Next physical within 3 mo: Visit date not found      Jessica Parks, [facility] Triage/Med Refill 10/8/2020    Requested Prescriptions   Pending Prescriptions Disp Refills     viscous lidocaine HC (LIDOCAINE) 2 % Soln viscous solution [Pharmacy Med Name: LIDOCAINE 2% VISC ORAL SOLUTION] 200 mL 2     Sig: SWISH WITH 5ML BY MOUTH EVERY 2 HOURS AS NEEDED       There is no refill protocol information for this order

## 2021-06-12 NOTE — TELEPHONE ENCOUNTER
Controlled Substance Refill Request  Medication Name:   Requested Prescriptions     Pending Prescriptions Disp Refills     oxyCODONE-acetaminophen (PERCOCET)  mg per tablet 20 tablet 0     Sig: Take 1 every 6-8 hours as needed for severe pain     Date Last Fill: 10/5/2020  [facility]  Submit electronically to pharmacy  Controlled Substance Agreement on file:   Encounter-Level CSA Scan Date - 04/23/2018:    Scan on 4/25/2018 11:57 AM           Encounter-Level CSA Scan Date - 12/05/2016:    Scan on 12/5/2016        Last office visit:  7/24/2020       Please fill today as patient is out of above medication.

## 2021-06-12 NOTE — TELEPHONE ENCOUNTER
Controlled Substance Refill Request  Medication Name:   Requested Prescriptions     Pending Prescriptions Disp Refills     oxyCODONE-acetaminophen (PERCOCET)  mg per tablet 20 tablet 0     Sig: Take 1 every 6-8 hours as needed for severe pain     Date Last Fill: 9/9/20  Is patient out of medication?:  Yes  Patient notified refills processed within 3 business days:  Yes  [facility]ed [facility]  Submit electronically to pharmacy  Controlled Substance Agreement on file:   Encounter-Level CSA Scan Date - 04/23/2018:    Scan on 4/25/2018 11:57 AM           Encounter-Level CSA Scan Date - 12/05/2016:    Scan on 12/5/2016        Last office visit:  8/4/20

## 2021-06-12 NOTE — TELEPHONE ENCOUNTER
RN cannot approve Refill Request    RN can NOT refill this medication med is not covered by policy/route to provider. Last office visit: 1/20/2020 Flakita Dawson MD Last Physical: Visit date not found Last MTM visit: Visit date not found Last visit same specialty: 1/20/2020 Flakita Dawson MD.  Next visit within 3 mo: Visit date not found  Next physical within 3 mo: Visit date not found      Jeaneth Galeana, [facility] Triage/Med Refill 10/8/2020    Requested Prescriptions   Pending Prescriptions Disp Refills     lidocaine (LIDODERM) 5 % 30 patch 3     Sig: Apply 1/2 to 1 patch over left lateral hip and/or low back regions prn,  for up to 12 hrs. Then wait 12 hrs prior to applying another patch.       There is no refill protocol information for this order

## 2021-06-12 NOTE — TELEPHONE ENCOUNTER
Refill Request  Did you contact pharmacy: Yes  Medication name:   Requested Prescriptions     Pending Prescriptions Disp Refills     viscous lidocaine HC (LIDOCAINE) 2 % Soln viscous solution 200 mL 2     Sig: SWISH WITH 5ML BY MOUTH EVERY 2 HOURS AS NEEDED     Who prescribed the medication:   JOEL BOUDREAUX  Requested Pharmacy: [facility] [id number]  Is patient out of medication: Yes  Patient notified refills processed in 3 business days:  yes  Okay to leave a detailed message: yes      Patient would like this filled today.  Patient is out of above medication.

## 2021-06-12 NOTE — TELEPHONE ENCOUNTER
RN cannot approve Refill Request    RN can NOT refill this medication med is not covered by policy/route to provider. Last office visit: 1/20/2020 Flakita Dawson MD Last Physical: Visit date not found Last MTM visit: Visit date not found Last visit same specialty: 1/20/2020 Flakita Dawson MD.  Next visit within 3 mo: Visit date not found  Next physical within 3 mo: Visit date not found      Anni Byrne, [facility] Triage/Med Refill 11/5/2020    Requested Prescriptions   Pending Prescriptions Disp Refills     viscous lidocaine HC (LIDOCAINE) 2 % Soln viscous solution 200 mL 2     Sig: SWISH WITH 5ML BY MOUTH EVERY 2 HOURS AS NEEDED       There is no refill protocol information for this order

## 2021-06-12 NOTE — PROGRESS NOTES
"  Assessment and Plan    1. 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  Plan by ENT before knowing results of test was to start oral steroids IF the tests suggested autoimmune issue.  So I will start prednisone on a slow taper, as will as refill her oxycodone and viscous lidocaine.    It looks like she did have a prescription for prednisone in the past - she does not remember any issues with taking it    - oxyCODONE-acetaminophen (PERCOCET)  mg per tablet; Take 1 tablet by mouth every 6 (six) hours as needed for pain.  Dispense: 21 tablet; Refill: 0  - viscous lidocaine HC (LIDOCAINE) 2 % Soln viscous solution; RINSE AND GARGLE WITH 5 ML BY MOUTH EVERY 2 HOURS AS NEEDED  Dispense: 200 mL; Refill: 0  - predniSONE (DELTASONE) 10 mg tablet; 6 po qd for 2 days, then 5 po qd for 2d, then 4  po qd for 2d, then 3 po qd for 2d, then 2  po qd for 2d, then off  Dispense: 40 tablet; Refill: 0      Carolyn Toro MD     -------------------------------------------    Chief Complaint   Patient presents with     Mouth sores (inner lip)     x 3 days, sore and painful     Dr's note for class     Rx refill     meds pended     Suad recently saw an ear, nose and throat specialist at [facility] to follow up on what has been thought to be recurrent oral herpes infections.  She says the Valtrex has not worked to treat these, and even seems to make things worse.  She says this specialist took a lot of blood tests.  She is  set to see a dermatologist too next week.  Has to follow up with ENT first, and then will see another specialist (rheumatology?).    I was able to access her records and tests from 8/29/17 through [facility]:    Here is the plan from her visit with Dr. Kenyon:    \"PLAN:  Kenalog and orabase  Ask Dr. Escoto to see her, lips biopsy?  If biopsy " "were to suggest lupus or other AA problem then probably systemic steroids and Rheumatology takes the ball from here.   I doubt HIV is driving this and did not retest today.  \"      [facility]  Component Name Value Ref Range   ANTINUCLEAR ANTIBODY (ELDA) Positive (A) Negative    ELDA PATTERN 1 Speckled (A) (none)    ELDA TITER 1 1:320 (A) (none)    SPECIMEN STATUS Serum will be saved for 30 Days     Specimen   Blood     [facility]  Component Name Value Ref Range   COMPLEMENT, TOTAL 67   Comment:    Test Performed by:  [facility]  [address] [place] [address]      Component Name  8/29/2017     49 (H)   0.70 (H)   SEDIMENTATION RATE          C-REACTIVE PROTEIN              Patient Active Problem List   Diagnosis     Streptococcal Sore Throat     Sore Throat     Skin: A Rash     Vitamin D Deficiency     Iron Deficiency Anemia Secondary To Chronic Blood Loss     Recurrent Aphthous Ulcer     Depression     Herpes Simplex     Abdominal pain       Current Outpatient Prescriptions on File Prior to Visit   Medication Sig Dispense Refill     ferrous sulfate 324 mg (65 mg iron) TbEC        MULTIVITAMINS WITH IRON (MULTI-VITAMIN WITH IRON ORAL) Take 1 tablet by mouth daily. Tablets       prenatal vitamin iron-folic acid 27mg-0.8mg (PRENATAL S) 27 mg iron- 800 mcg Tab tablet TK ONE T D  0     valACYclovir (VALTREX) 1000 MG tablet TAKE 1 TABLET BY MOUTH THREE TIMES DAILY 21 tablet 10     No current facility-administered medications on file prior to visit.          History   Smoking Status     Former Smoker     Quit date: 5/12/2017   Smokeless Tobacco     Not on file       History   Alcohol use Not on file       Review of Systems - she does not ongoing fatigue, but denies myalgias or arthralgias  . Although she does not have a current rash, sShe does note that in 2010 she had a biopsy for a skin rash that may have " shown lupus    Vitals:    08/31/17 1104   BP: 92/80   Pulse: 78   Resp: 12   Temp: 98.3  F (36.8  C)     Body mass index is 20.22 kg/(m^2).     EXAM:    General appearance - alert, well appearing, and in no distress  Mouth - lips are are swollen, with multiple small, weeping, ulcer-like lesions.  She also also some ulcers on her gums

## 2021-06-12 NOTE — PROGRESS NOTES
OFFICE VISIT    ASSESSMENT/PLAN:   Suad was seen today for oral herpes.  Diagnoses and all orders for this visit:    Cold sore  Herpetic gingivostomatitis: appears severe, tender. Sores present on lips and oral mucosa and tongue. Patient able to eat/drink. No respiratory compromise. Patient requesting referral to ID, which I think is reasonable as she has been dealing with this for about 16 years now and still has outbreaks about once a month. Will re-fill medications as below to help in the acute process.   -     valACYclovir (VALTREX) 1000 MG tablet; TAKE 1 TABLET BY MOUTH THREE TIMES DAILY  -     oxyCODONE-acetaminophen (PERCOCET)  mg per tablet; Take 1 tablet by mouth every 6 (six) hours as needed for pain.  -     viscous lidocaine HC (LIDOCAINE) 2 % Soln viscous solution; RINSE AND GARGLE WITH 5 ML BY MOUTH EVERY 2 HOURS AS NEEDED  -     Ambulatory referral to Infectious Disease    Please return to clinic as needed.    The assessment, treatment options, and plan was discussed with the patient, who was in agreement. Questions were answered.      SUBJECTIVE: 37 y.o. yo female with history of HSV, anemia who presents with oral herpes. Started last Tuesday, she started having symptoms and started taking valacyclovir (1000mg TID for a 3-4 days). On Friday, it started to hurt more. The bumps continued to spread. Since Monday, it has been worsening. She can barely talk. Drinking okay, through a straw. She can swallow some potatoes when she uses the lidocaine and percocet. Has celiac disease as well. New stressors include trying to keep job. No side effects with the medication. She has outbreaks usually around the fall. She gets outbreaks about once a month when the outbreaks do occur. The herpes diagnosis was completed in [place] ~16 years ago and they did a swab at that time. No allergies.    The 10 point ROS is negative except as stated in the subjective.     OBJECTIVE:  BP 98/62 (Patient Site: Left Arm,  "Patient Position: Sitting, Cuff Size: Adult Regular)  Pulse 72  Ht 5' 6\" (1.676 m)  Wt 121 lb (54.9 kg)  BMI 19.53 kg/m2  Gen: NAD. Alert, oriented. Cooperative.   HEENT: Normocephalic, atraumatic. Pupils equal, sclera clear. No nasal drainage. Mouth: there are multiple blisters in varying stages of healing with some areas of open skin affecting the entirety of both the upper and lower lips. There are multiple sores noted on the buccal mucosal bilaterally, soft/hard palate, and on the tongue. Some of these lesions are oozing blood. Most of the lesions have a clear, thick discharge noted. All areas are tender to touch.  Cardiac: RRR. S1, S2 present. No murmur, rub, gallop.   Respiratory: CTAB. No wheeze, rales, rhonchi.   Integumentary: As above, otherwise no concerning rash or lesions.    Harsh Paz MD  8/16/2017 8:18 AM        "

## 2021-06-13 NOTE — TELEPHONE ENCOUNTER
Central PA team  [phone]  Pool: HE PA MED ([id number])          PA has been initiated.       PA form completed and faxed insurance via Cover My Meds     Key:  [id number] - PA Case ID: [id number]     Medication:  oxyCODONE-Acetaminophen 10-325MG tablets    Insurance:  [employer]        Response will be received via fax and may take up to 5-10 business days depending on plan

## 2021-06-13 NOTE — TELEPHONE ENCOUNTER
Prior Authorization Request  Who s requesting:  Pharmacy  Pharmacy Name and Location: [id number]  Medication Name: oxyCODONE-acetaminophen (PERCOCET)  mg per tablet  Insurance Plan: [employer]  Insurance Member ID Number:  [id number]  CoverMyMeds Key: N/A  Informed patient that prior authorizations can take up to 10 business days for response:   NA  Okay to leave a detailed message: Yes

## 2021-06-13 NOTE — PROGRESS NOTES
SUBJECTIVE: Suad Gongora is a 37 y.o. female with:  Chief Complaint   Patient presents with     Other     herpes outbreak, wanting to get prednisone and percocet     Medication Refill     magic wash, percocent, predinsone, lidocaine liq     Dental Injury     blacked out 9/27/17 - hit face, chipped x 2 tooth    She has a long standing issue with outbreaks of ulcers on her lips/ oral mucosa.  She saw ENT/ ID and had positive ELDA so it is suspected she has SLE as a cause for the recurrent outbreaks.  She has been managing the outbreaks with topical lidocaine/ Percocet for pain.  She did see Dr. Toro end of August and was placed on a prednisone taper that did help a lot with the lesions. She unfortunately did have a recurrent outbreak and wonders about going back on Prednisone again.  She is out of her Percocet.  She is due to see rheumatology but not until November.    She got up in the middle of the night and had brief loss of consciousness. She fell and  her 2 front teeth and cut her lip.  She needs work on the teeth but cannot undergo any with her current outbreak.  She is also having a lot of pain and discharge from the cut area of the lip.  No fevers/ chills.    She complains she has been having visual issues for awhile.  She has some blurry vision in both eyes.    SH: She does have some pain in the right hip going down the leg.  She has some rashes on her elbows that flare-up when her oral ulcers flare.  She has been feeling very anxious about her situation.  She has been avoiding bread and gluten products and does feel it helps somewhat.    Patient Active Problem List   Diagnosis     Skin: A Rash     Vitamin D Deficiency     Iron Deficiency Anemia Secondary To Chronic Blood Loss     Recurrent Aphthous Ulcer     Depression     Herpes Simplex     Abdominal pain     Elevated antinuclear antibody (ELDA) level    SH: Single. Lives with her children.    OBJECTIVE: /60 (Patient Site: Right Arm, Patient  "Position: Sitting, Cuff Size: Adult Regular)  Pulse 60  Resp 12  Ht 5' 6\" (1.676 m)  Wt 118 lb 4 oz (53.6 kg)  Breastfeeding? No  BMI 19.09 kg/m2 no distress  OP: Scattered areas ulceration on oral mucosa/ hard palate.  Upper two teeth are chipped.  Lower lip is swollen/ enlarged/ ulcerated and bleeding.  Neck: Supple.  1+ bilateral cervical lymphadenopathy.  Elbows: Dry plaques.  Neuro: AAOx3.  Normal strength and tone. Normal gait.  Psych:  Tearful/ distressed.    Suad was seen today for other, medication refill and dental injury.    Diagnoses and all orders for this visit:    Elevated antinuclear antibody (ELDA) level - I am going to give her another burst of steroids then have her continue on prednisone 10 mg daily until she sees rheumatology.  Will try to move up her appointment.  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  -     alum/mag hydrox-simethicone-diphenhydramine-lidocaine (MAGIC MOUTHWASH) suspension; Swish and spit 15 mL 4 (four) times a day.    Mouth injury - I am going to cover her with antibiotics then she can make appointment with dentist when mouth ulcers are better.  -     penicillin VK (PEN VK) 500 MG tablet; Take 1 po TID x 1 week    Visual symptoms - Not clear if this is related to her autoimmune condition.  -     Ambulatory referral to Ophthalmology    Flakita Dawson            "

## 2021-06-13 NOTE — TELEPHONE ENCOUNTER
Call pt - she is due for med check with me.  She can do a phone visit Dec 15 or Dec 22. Also please let her know of following changes in system:    The [facility] clinic is permanently closing.  I will be practicing at the [facility].  If you feel that it is too far to travel there for care and/ or prefer a provider in the [facility], I highly recommend establishing care with one of the following two new providers:  Dr. Kamala Tyler at [facility] or Dr. Janice Rea at [facility].  We will all be starting at our new clinics in December 2020.

## 2021-06-13 NOTE — TELEPHONE ENCOUNTER
Controlled Substance Refill Request  Medication Name:   Requested Prescriptions     Pending Prescriptions Disp Refills     oxyCODONE-acetaminophen (PERCOCET)  mg per tablet 20 tablet 0     Sig: Take 1 every 6-8 hours as needed for severe pain     Date Last Fill: 11/5/20  Requested Pharmacy: [facility]  Submit electronically to pharmacy  Controlled Substance Agreement on file:   Encounter-Level CSA Scan Date - 04/23/2018:    Scan on 4/25/2018 11:57 AM           Encounter-Level CSA Scan Date - 12/05/2016:    Scan on 12/5/2016        Last office visit:  7/31/20

## 2021-06-14 NOTE — TELEPHONE ENCOUNTER
This patient is on our lab schedule today, we have no orders for her. She said she was supposed to have labs done for Dr. Burroughs, but was a no show for her last apt with him. Please advise.

## 2021-06-14 NOTE — TELEPHONE ENCOUNTER
"Left message for pt to call back for lab results    Dr Burroughs's comments below:  \"ESR and CRP levels are nonspecific inflammatory markers, which need to be correlated clinically. ESR/sedimentation rate noted to be mildly elevated (not new and improved compared to prior level), while CRP/C-reactive protein returned within normal limits.      Significant improvement noted involving urine microalbumin level, currently within normal limits.     Noted to have stable anemia.  Recommend periodic monitoring.       Regarding urinalysis, given moderate blood with microscopic red blood cells noted, please inquire if patient was menstruating when sample provided.  If yes, recommend repeating when not menstruating.  If not, recommend patient see a urologist and place order.     Urine culture was negative for growth.     Remainder of lab results were stable.     Message received from PCP stating that she currently does not have any active aphthous ulcers.  Also informed that she did not start prednisone and Plaquenil as prescribed on previous visit.  Since no aphthous ulcers at this time, she can just start Plaquenil 200 mg twice a day as prescribed and hold off on starting prednisone.  Follow-up with ophthalmology within 3 to 6 months of starting Plaquenil, if needs referral, please place order.     Check labs (CBC with differential, creatinine and liver enzymes about 6 weeks after starting Plaquenil, if stable every 4 to 6 months thereafter), please place standing order.\"  "

## 2021-06-14 NOTE — TELEPHONE ENCOUNTER
Discharge instructions were given to the patient by Rancho Bean RN. The patient left the Emergency Department ambulatory, alert and oriented and in no acute distress with 2 prescriptions. The patient was encouraged to call or return to the ED for worsening issues or problems and was encouraged to schedule a follow up appointment for continuing care. The patient verbalized understanding of discharge instructions and prescriptions, all questions were answered. The patient has no further concerns at this time. Patient called back, relayed message she declined to schedule at this time.

## 2021-06-14 NOTE — TELEPHONE ENCOUNTER
Left message to call back for: appt   Information to relay to patient: appt needed prior to fill. VV ok

## 2021-06-14 NOTE — TELEPHONE ENCOUNTER
Call pt - She was last seen in July.  Needs visits every 3 months as we have discussed in past to continue her chronic pain meds.  She can make a virtual visit if she likes.

## 2021-06-14 NOTE — PROGRESS NOTES
Suad Gongora is a 40 y.o. female who is being evaluated via a billable video visit.      How would you like to obtain your AVS? MyChart.  If dropped from the video visit, the video invitation should be resent by: Text to cell phone: [phone]  Will anyone else be joining your video visit? No      Video Start Time: 2:09 PM  Assessment & Plan     Suad was seen today for follow-up.    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  She also needs to have visits every 3 months on the chronic narcotics.  She is planning to be seen at [facility] and can have blood work done there in the near future.    Elevated antinuclear antibody (ELDA) level    Undifferentiated connective tissue disease (H)- Check CBC/ CMP.  Refer to rheumatology.        Review of prior external note(s) from - Dr. Burroughs, rheumatologist     No follow-ups on file.    Flakita Dawson MD  [facility]     Suad Gongora is 40 y.o. and presents to clinic today for the following health issues   HPI     She has h/o recurrent aphous ulcers.  She has been seen by rheumatology - last appointment was in July.  Has weakly positive ELDA.  She has been on Plaquenil but ran out.  Was supposed to follow with rheumatology this fall but did not schedule a f/u visit.  She is asking to see a different rheumatologist. She has not had lab work since last year.  She is getting outbreaks of oral ulcers monthly.  Not getting lesions on her lips.  She uses the Percocet for oral pain along with the topical lidocaine solution.  No side effects on  the Percocet- uses #20 tabs/ month.    She is currently not working.  Trying to get some training for another career in health.  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    She has some hip pain she would like evaluated.    Has h/o iron deficiency.  I did recommend she consider switching her IUD and have a consult with midwives but she has not done that.    Review of Systems  As above      Objective       Vitals:  No vitals were obtained today due to virtual visit.    Physical Exam  None    I reviewed the  and her use of Percocet is appropriate.    Lab Results   Component Value Date    HGB 9.7 (L) 01/20/2020     Results for orders placed or performed in visit on 01/17/19   Comprehensive Metabolic Panel   Result Value Ref Range    Sodium 141 136 - 145 mmol/L    Potassium 4.0 3.5 - 5.0 mmol/L    Chloride 108 (H) 98 - 107 mmol/L    CO2 23 22 - 31 mmol/L    Anion Gap, Calculation 10 5 - 18 mmol/L    Glucose 70 70 - 125 mg/dL    BUN 10 8 - 22 mg/dL    Creatinine 0.62 0.60 - 1.10 mg/dL    GFR MDRD Af Amer >60 >60 mL/min/1.73m2    GFR MDRD Non Af Amer >60 >60 mL/min/1.73m2    Bilirubin, Total 0.1 0.0 - 1.0 mg/dL    Calcium 9.1 8.5 - 10.5 mg/dL    Protein, Total 7.3 6.0 - 8.0 g/dL    Albumin 3.4 (L) 3.5 - 5.0 g/dL    Alkaline Phosphatase 70 45 - 120 U/L    AST 14 0 - 40 U/L    ALT <9 0 - 45 U/L         This was converted to a phone visit.  Total time on phone- 13 minutes.    Flakita Dawson

## 2021-06-15 NOTE — TELEPHONE ENCOUNTER
I spoke with patient and she does have heavy menstrual periods.  She has been taking iron on and off but not on a regular basis.  She also has not followed up with pelvic ultrasound that I ordered in the past.    No shortness of breath / chest pain / dizziness.  She thinks she might have UTI.    I am going to have her start on iron two times a day and recheck labs in 2 weeks.  Call if she develops any symptoms.  Will make referral to Dr. Valdez for menorrhagia.  F/U with rheumatology.

## 2021-06-15 NOTE — TELEPHONE ENCOUNTER
Pt was scheduled with Dr Pratt for Consult yesterday, we could not reach pt for appt. Asked schedulers to call to reschedule consult with Dr Pratt

## 2021-06-15 NOTE — TELEPHONE ENCOUNTER
Patient is calling for a chest x-ray order and a Hydroxychlorquine refill.  She also needs another medication refill and she is unsure of the name of it.

## 2021-06-15 NOTE — TELEPHONE ENCOUNTER
Please call pt to reschedule appt with Dr Pratt, pt missed appt yesterday as we were unable to reach pt

## 2021-06-15 NOTE — TELEPHONE ENCOUNTER
Reason for Call:  Medication or medication refill:  oxyCODONE-acetaminophen (PERCOCET)  mg per tablet 20 tablet         Do you use a [facility]?  Name of the pharmacy and phone number for the current request[facility]     Name of the medication requested:  oxyCODONE-acetaminophen (PERCOCET)  mg per tablet 20 tablet         Other request: pt did get lab work done, and does  have an appt with rheumatology    Can we leave a detailed message on this number? Yes    Phone number patient can be reached at: Home number on file [phone] (home)    Best Time: any      Call taken on 2/22/2021 at 10:57 AM by Marcelina Morales

## 2021-06-15 NOTE — TELEPHONE ENCOUNTER
Pt notified of lab results, verbalized understanding. Scheduled appt for repeat urinalysis on 2/8 at [facility]. Lab order entered.     Pt would like to go back on HCQ. Pt has not had her eye exam in over 6 months. Pt will call and schedule eye exam and then call us back to have HCQ rx sent to pharmacy.

## 2021-06-15 NOTE — TELEPHONE ENCOUNTER
RN cannot approve Refill Request    RN can NOT refill this medication med is not covered by policy/route to provider. Last office visit: 1/20/2020 Flakita Dawson MD Last Physical: Visit date not found Last MTM visit: Visit date not found Last visit same specialty: 1/20/2020 Flakita Dawson MD.  Next visit within 3 mo: Visit date not found  Next physical within 3 mo: Visit date not found      Sondra Seymour, [facility] Triage/Med Refill 2/14/2021    Requested Prescriptions   Pending Prescriptions Disp Refills     viscous lidocaine HC (LIDOCAINE) 2 % Soln viscous solution [Pharmacy Med Name: LIDOCAINE 2% VISC ORAL SOLUTION] 200 mL 2     Sig: SWISH WITH 5ML BY MOUTH EVERY 2 HOURS AS NEEDED       There is no refill protocol information for this order

## 2021-06-15 NOTE — TELEPHONE ENCOUNTER
Critical hgb results from todays lab appt of 7.6. Results reported out to her PCP's office, just wanted to let you know since you were the ordering provider.    Thanks

## 2021-06-15 NOTE — TELEPHONE ENCOUNTER
Plaquenil refilled.    Please see separate message sent regarding low hemoglobin.  Similar message also sent to you associated with lab results.

## 2021-06-15 NOTE — TELEPHONE ENCOUNTER
Regarding recent lab results, please discuss verbally with the patient.    Noted to have E. coli growing in urine culture consistent with having a urinary tract infection, recommend patient discuss with her primary care physician, please send a copy of results to patient's PCP.    Noted to have worsening anemia, please inquire if patient has been taking her iron supplements?  If not, recommend taking as directed by her primary care physician.  Please place urgent referral to hematology and emphasize to patient importance that she follow through with this referral.  Recommend she also discuss further with her primary care physician.  Please send a copy of results to patient's PCP.  Please inquire from lab if they are performing microscopic cell count analysis/component of urinalysis.  If not, can they please add on?    Mildly elevated urine microalbumin/creatinine ratio level, perhaps UTI playing a role.    Normal creatinine/kidney function level and liver enzymes    Noted to have mild low low C4 (complement level), this test result can sometimes be associated with active autoimmunity, however having a low level is nonspecific and needs to be correlated clinically.  Other complement level called C3, noted to be within normal limits.    Minimally low serum albumin/protein level, stable when trending.    ESR and CRP levels are nonspecific inflammatory markers, they returned elevated (not new), these levels need to be correlated clinically.      If patient has symptoms of lightheaded or chest pain/shortness of breath or palpitations she should go to an urgent/emergent care facility.

## 2021-06-15 NOTE — TELEPHONE ENCOUNTER
Pt had routine labs ordered by Dr. Burroughs and hgb came back at 7.6.  This appears to be chronic.  Pt has h/o menorrhagia and last hgb was 9.7 1 year ago.  I called pt but NA.  LM alerting her to the low hemoglobin and need for further work-up.  Will try reaching her again.  I advised if she has symptoms- shortness of breath / chest pain / dizziness or heavy menstrual bleeding needs to be evaluated in ER.

## 2021-06-15 NOTE — TELEPHONE ENCOUNTER
Tried calling pt, no answer. Voicemail box is full.     Will try sending [employer] message as well to get pt this information.    Hematology referral entered in chart. Per notes below, PCPs office was informed of critical lab values as well.

## 2021-06-15 NOTE — TELEPHONE ENCOUNTER
Patient called to say she sent her opioid contract in the mail.  Patient is currently out of medication.

## 2021-06-16 PROBLEM — M32.9: Status: ACTIVE | Noted: 2017-10-26

## 2021-06-16 PROBLEM — R76.8 ELEVATED ANTINUCLEAR ANTIBODY (ANA) LEVEL: Status: ACTIVE | Noted: 2017-09-06

## 2021-06-16 PROBLEM — K06.9: Status: ACTIVE | Noted: 2017-10-26

## 2021-06-16 NOTE — TELEPHONE ENCOUNTER
RN cannot approve Refill Request    RN can NOT refill this medication Protocol failed and NO refill given. Last office visit: 1/20/2020 Flakita Dawson MD Last Physical: Visit date not found Last MTM visit: Visit date not found Last visit same specialty: 1/20/2020 Flakita Dawson MD.  Next visit within 3 mo: Visit date not found  Next physical within 3 mo: Visit date not found      Ashley Up, [facility] Triage/Med Refill 4/7/2021    Requested Prescriptions   Pending Prescriptions Disp Refills     viscous lidocaine HC (LIDOCAINE) 2 % Soln viscous solution [Pharmacy Med Name: LIDOCAINE 2% VISC ORAL SOLUTION] 200 mL 2     Sig: SWISH WITH 5ML BY MOUTH EVERY 2 HOURS AS NEEDED       There is no refill protocol information for this order

## 2021-06-16 NOTE — TELEPHONE ENCOUNTER
Please call pt and let her know it is very important to recheck her hemoglobin.  I have labs pending so she can go to [facility] for a recheck.  I would like to schedule a virtual visit with her as well to follow-up on a more long term plan for her health.

## 2021-06-17 NOTE — TELEPHONE ENCOUNTER
Reason for Call:  Medication or medication refill:  oxyCODONE-acetaminophen (PERCOCET)  mg per tablet 20 tablet       Do you use a [facility]?  Name of the pharmacy and phone number for the current request: [facility]    Name of the medication requested:   oxyCODONE-acetaminophen (PERCOCET)  mg per tablet 20 tablet         Other request:    Can we leave a detailed message on this number? Yes    Phone number patient can be reached at: Home number on file [phone] (home)    Best Time: any    Call taken on 5/20/2021 at 1:52 PM by Marcelina Morales

## 2021-06-17 NOTE — TELEPHONE ENCOUNTER
Telephone Encounter by Idalia Helm LPN at 3/31/2020  2:54 PM     Author: Idalia Helm LPN Service: -- Author Type: Licensed Nurse    Filed: 3/31/2020  2:56 PM Encounter Date: 3/30/2020 Status: Signed    : Idalia Helm LPN (Licensed Nurse)       Patient Returning Call  Reason for call:  Patient returning call   Information relayed to patient:  Flakita Dawson MD   to Flakita Dawson Care Team [facility]ler         12:36 PM   Note      Please see if she is taking the Plaquenil as prescribed by rheumatology since she would be at higher risk since the medicine lowers immune system but if not taking medicine having Lupus means the immune system is overactive rather than suppressed so would not be at any further risk than average person. Please see if pt taking med.  Please let me her know I recommend she take vitamin C 1000 mg two times a day daily.      Patient has additional questions:  Yes  If YES, what are your questions/concerns:  Patient states she is taking Plaquenil . Patient is questioning if provider is going to write a latter for her work to take time off ? Please call patient with information .  Okay to leave a detailed message?: No

## 2021-06-17 NOTE — PROGRESS NOTES
Suad Gongora is a 40 y.o. female who is being evaluated via a billable video visit.      How would you like to obtain your AVS? MyChart.  If dropped from the video visit, the video invitation should be resent by: Send to e-mail at: [email or web address]  Will anyone else be joining your video visit? No      Video Start Time: 2:45 pm    Assessment & Plan     Suad was seen today for itchy eye and medication refill.    Diagnoses and all orders for this visit:    Allergic conjunctivitis, bilateral  -     olopatadine (PATANOL) 0.1 % ophthalmic solution; Administer 1 drop to both eyes 2 (two) times a day.    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   checked and pt taking meds appropriately.  -     oxyCODONE-acetaminophen (PERCOCET)  mg per tablet; Take 1 every 6-8 hours as needed for severe pain    Blepharitis of both eyes, unspecified eyelid, unspecified type  -     erythromycin ophthalmic ointment; Apply to affected areas two times a day    Anemia- Improved.  Continue on iron.    Prescription drug managementReturn in about 3 months (around 7/26/2021) for Recheck.       Flakita Dawson MD  [facility]   Suad Gongora is 40 y.o. and presents today for the following health issues   HPI   1) Anemia: She is taking iron supplements and her HgB has increased.  She is still having heavy menses.  Has not followed up with GYN.    2) Eye redness- She has had redness in both eyes for 4 days.  Eyes are itchy with some clear discharge but crusting in am.  No sneezing or rhinorrhea.  No trouble with vision. She does have eye appointment in near future as she is on Plaquenil.    3) SLE- She has not had oral ulcer outbreak in 1 1/2 months.  She has been trying to eat a healthier diet.  Avoids dairy/ gluten. Eating more fruit.  She complains of chronic  pain in her hips.  Uses oxycodone sparingly #20 / month with no side effects.      Review of Systems  As above      Objective       Vitals:  No vitals were obtained today due to virtual visit.    Physical Exam  GENERAL: Healthy, alert and no distress  EYES: Eyes grossly normal to inspection. No discharge or erythema, or obvious scleral/conjunctival abnormalities.  RESP: No audible wheeze, cough, or visible cyanosis.  No visible retractions or increased work of breathing.    NEURO: Cranial nerves grossly intact. Mentation and speech appropriate for age.  PSYCH: Mentation appears normal, affect normal/bright, judgement and insight intact, normal speech and appearance well-groomed    Lab Results   Component Value Date    FERRITIN 47 04/02/2021     Lab Results   Component Value Date    HGB 9.4 (L) 04/02/2021          Video-Visit Details    Type of service:  Video Visit    Video End Time (time video stopped): 3:05 pm  Originating Location (pt. Location): Home    Distant Location (provider location):  [facility]     Platform used for Video Visit: [employer]

## 2021-06-17 NOTE — TELEPHONE ENCOUNTER
RN cannot approve Refill Request    RN can NOT refill this medication med is not covered by policy/route to provider. Last office visit: 1/20/2020 Flakita Dawson MD Last Physical: Visit date not found Last MTM visit: Visit date not found Last visit same specialty: 1/20/2020 Flakita Dawson MD.  Next visit within 3 mo: Visit date not found  Next physical within 3 mo: Visit date not found      Sondra Seymour, [facility] Triage/Med Refill 5/5/2021    Requested Prescriptions   Pending Prescriptions Disp Refills     viscous lidocaine HC (LIDOCAINE) 2 % Soln viscous solution [Pharmacy Med Name: LIDOCAINE 2% VISC ORAL SOLUTION] 200 mL 2     Sig: SWISH WITH 5ML BY MOUTH EVERY 2 HOURS AS NEEDED       There is no refill protocol information for this order

## 2021-06-17 NOTE — TELEPHONE ENCOUNTER
Telephone Encounter by Heidi Sinha at 11/10/2020  2:43 PM     Author: Heidi Sinha Service: -- Author Type: --    Filed: 11/10/2020  2:44 PM Encounter Date: 11/10/2020 Status: Signed    : Heidi Sinha APPROVED:    Approval start date: 11/10/2020  Approval end date:  11/10/2021    Pharmacy has been notified of approval and will contact patient when medication is ready for pickup.

## 2021-06-17 NOTE — PROGRESS NOTES
SUBJECTIVE: Suad Gongora is a 37 y.o. female with:  Chief Complaint   Patient presents with     Follow-up     Med check     Left side pain     Medication Refill    1) Recurrent oral ulcers:  She has long history of recurrent oral ulcers.  She gets the lesions on her lips and in her mouth and they are quite painful.  She saw ID and had work-up which showed elevated ELDA so it was felt she had autoimmune disease causing the outbreaks.  She was referred to rheumatology numerous times but has yet to make it to an appointment.  She was treated with prednisone in the past and she did respond well to the medication.  She had outbreak about 1 month ago.  She is getting an outbreak about every month.  She uses lidocaine topically for relief and also takes oxycodone when she has an outbreak.  Her celiac panel did show elevated gliadin IgA.  She has cut out dairy / wheat and eating protein and vegetables.  Dairy has caused diarrhea.  She has had a pain contract for the oxycodone and she has been compliant according to the [facility] web site.  Her next rheumatology appointment is scheduled out in June.    2) She has some blurred vision and is having urinary frequency and increased thirst.  Her cousin has diabetes mellitus.  She gets craving for sugar in the night time.    3) She has pain in the left abdomen.  Started 2 months ago.  Pain is present every other day.  No pain with exercise.  She has trouble with constipation - will have bowel movement every 2 days.  Not recently sexually active - last intercourse December 16.  No dysuria.    4) She feels that her legs are going to give out.  She feels like she is going to fall.  Happens 1-2x a week.  She did fall several months ago.  She has some low back pain.  She has some numbness / tingling in her feet.  Left leg will start shaking at times when she is driving.  When she had her last oral outbreak she felt weak and had to crawl to get around- she had to stay in bed.    ROS:  She  "is having a lot of headaches.  She is on Depo Provera for contraception.  No significant joint pain.  SH: Lives with her children.  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  Patient Active Problem List   Diagnosis     Skin: A Rash     Vitamin D Deficiency     Iron deficiency anemia     Recurrent Aphthous Ulcer     Depression     Herpes Simplex     Abdominal pain     Elevated antinuclear antibody (ELDA) level        OBJECTIVE: /80 (Patient Site: Left Arm, Patient Position: Sitting, Cuff Size: Adult Regular)  Pulse 76  Ht 5' 6\" (1.676 m)  Wt 133 lb 4 oz (60.4 kg)  BMI 21.51 kg/m2   No acute distress.  HEENT: Head is atraumatic and/normocephalic.  PERRL.  Conjunctiva clear.  Tympanic membranes grey with normal landmarks and normal light reflexes.  No nasal discharge.  Oropharynx is pink and moist.    Neck: Supple.  1+ bilateral anterior cervical lymphadenopathy.  No thyromegaly.  Lungs: Clear to auscultation.  No wheezing, retractions, or tachypnea.  Heart: RRR. S1 and S2 normal.  No murmurs, rubs, or gallops.  Neuro: Awake, alert, oriented x 3.  Normal strength and tone.  Normal gait.   Skin: No rash.  PSYCH:  Awake, alert, and oriented x 3.  Mood and affect are anxious Good eye contact.  Speech is normal.  No suicidal ideation.  No hallucinations.      Suad was seen today for follow-up, left side pain and medication refill.    Diagnoses and all orders for this visit:    Elevated antinuclear antibody (ELDA) level - I impressed upon patient the importance of keeping upcoming rheumatology referral if she is going to get to root causes of the oral outbreaks.  Will recheck labs today.  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  She can use " #20 tabs for each outbreak.  [place] checked [place]  program and her use of narcotics is appropriate.  -     oxyCODONE-acetaminophen (PERCOCET)  mg per tablet; Take 1 tablet by mouth every 6 (six) hours as needed for pain.    Acute pain of mouth  -     Drug Abuse 1+, Urine    Left lower quadrant pain  -     US Pelvis With Transvaginal Non OB; Future    Urinary frequency  -     Urinalysis-UC if Indicated  -     Culture, Urine    Leg weakness- Etiology unclear.  She has a number of symptoms that could be consistent with MS so will get MRI of brain.  -     Thyroid Aspen  -     MR Brain With Without Contrast; Future    Iron deficiency anemia  -     ferrous sulfate 324 mg (65 mg iron) TbEC; Take 1 po daily  -     Ferritin         Flakita Dawson

## 2021-06-18 NOTE — LETTER
Letter by Flakita Dawson MD at      Author: Flakita Dawson MD Service: -- Author Type: --    Filed:  Encounter Date: 1/17/2019 Status: (Other)         [facility] FAMILY MEDICINE/OB  01/17/19    Patient: Suad Gongora  YOB: 1980  Medical Record Number: [id number]                                                                  Opioid / Opioid Plus Controlled Substance Agreement    I understand that my care provider has prescribed an opioid (narcotic) controlled substance to help manage my condition(s). I am taking this medicine to help me function or work. I know this is strong medicine, and that it can cause serious side effects. Opioid medicine can be sedating, addicting and may cause a dependency on the drug. They can affect my ability to drive or think, and cause depression. They need to be taken exactly as prescribed. Combining opioids with certain medicines or chemicals (such as cocaine, sedatives and tranquilizers, sleeping pills, meth) can be dangerous or even fatal. Also, if I stop opioids suddenly, I may have severe withdrawal symptoms. Last, I understand that opioids do not work for all types of pain nor for all patients. If not helpful, I may be asked to stop them.        The risks, benefits, and side effects of these medicine(s) were explained to me. I agree that:    1. I will take part in other treatments as advised by my care team. This may be psychiatry or counseling, physical therapy, behavioral therapy, group treatment or a referral to a pain clinic. I will reduce or stop my medicine when my care team tells me to do so.  2. I will take my medicines as prescribed. I will not change the dose or schedule unless my care team tells me to. There will be no refills if I run out early.  I may be contactedwithout warning and asked to complete a urine drug test or pill count at any time.   3. I will keep all my appointments, and understand this is part of the  monitoring of opioids. My care team may require an office visit for EVERY opioid/controlled substance refill. If I miss appointments or dont follow instructions, my care team may stop my medicine.  4. I will not ask other providers to prescribe controlled substances, and I will not accept controlled substances from other people. If I need another prescribed controlled substance for a new reason, I will tell my care team within 1 business day.  5. I will use one pharmacy to fill all of my controlled substance prescriptions, and it is up to me to make sure that I do not run out of my medicines on weekends or holidays. If my care team is willing to refill my opioid prescription without a visit, I must request refills only during office hours, refills may take up to 3 days to process, and it may take up to 5 to 7 days for my medicine to be mailed and ready at my pharmacy. Prescriptions will not be mailed anywhere except my pharmacy.        [id number]  Rev 12/18         Registration to scan to EHR                             Page 1 of 2               Controlled Substance Agreement Opioid        [facility] FAMILY MEDICINE/OB  01/17/19  Patient: Suad Gongora  YOB: 1980  Medical Record Number: [id number]                                                                  6. I am responsible for my prescriptions. If the medicine/prescription is lost or stolen, it will not be replaced. I also agree not to share controlled substance medicines with anyone.  7. I agree to not use ANY illegal or recreational drugs. This includes marijuana, cocaine, bath salts or other drugs. I agree not to use alcohol unless my care team says I may.          I agree to give urine samples whenever asked. If I dont give a urine sample, the care team may stop my medicine.    8. If I enroll in the [place] Medical Marijuana program, I will tell my care team. I will also sign an agreement to share my medical records with my care team.    9. I will bring in my list of medicines (or my medicine bottles) each time I come to the clinic.   10. I will tell my care team right away if I become pregnant or have a new medical problem treated outside of my regular clinic.  11. I understand that this medicine can affect my thinking and judgment. It may be unsafe for me to drive, use machinery and do dangerous tasks. I will not do any of these things until I know how the medicine affects me. If my dose changes, I will wait to see how it affects me. I will contact my care team if I have concerns about medicine side effects.    I understand that if I do not follow any of the conditions above, my prescriptions or treatment may be stopped.      I agree that my provider, clinic care team, and pharmacy may work with any city, state or federal law enforcement agency that investigates the misuse, sale, or other diversion of my controlled medicine. I will allow my provider to discuss my care with or share a copy of this agreement with any other treating provider, pharmacy or emergency room where I receive care. I agree to give up (waive) any right of privacy or confidentiality with respect to these consents.     I have read this agreement and have asked questions about anything I did not understand.      ________________________________________________________________________  Patient signature - Date/Time -  Suad Gongora                                      ________________________________________________________________________  Witness signature                                                            ________________________________________________________________________  Provider signature - Flakita Dawson MD      [id number]  Rev 12/18         Registration to scan to EHR                         Page 2 of 2                   Controlled Substance Agreement Opioid           Page 1 of 2  Opioid Pain Medicines (also known as Narcotics)  What You Need to  Know    What are opioids?   Opioids are pain medicines that must be prescribed by a doctor.  They are also known as narcotics.    Examples are:     morphine (MS Contin, Magdalena)    oxycodone (Oxycontin)    oxycodone and acetaminophen (Percocet)    hydrocodone and acetaminophen (Vicodin, Norco)     fentanyl patch (Duragesic)     hydromorphone (Dilaudid)     methadone     What do opioids do well?   Opioids are best for short-term pain after a surgery or injury. They also work well for cancer pain. Unlike other pain medicines, they do not cause liver or kidney failure or ulcers. They may help some people with long-lasting (chronic) pain.     What do opioids NOT do well?   Opioids never get rid of pain entirely, and they do not work well for most patients with chronic pain. Opioids do not reduce swelling, one of the causes of pain. They also dont work well for nerve pain.                           For informational purposes only.  Not to replace the advice of your care provider.  Copyright [address] [facility]. All right reserved. Rapportive 998380-[id number] 02/18.      Page 2 of 2    Risks and side effects   Talk to your doctor before you start or decide to keep taking one of these medicines. Side effects include:    Lowering your breathing rate enough to cause death    Overdose, including death, especially if taking higher than prescribed doses    Long-term opioid use    Worse depression symptoms; less pleasure in things you usually enjoy    Feeling tired or sluggish    Slower thoughts or cloudy thinking    Being more sensitive to pain over time; pain is harder to control    Trouble sleeping or restless sleep    Changes in hormone levels (for example, less testosterone)    Changes in sex drive or ability to have sex    Constipation    Unsafe driving    Itching and sweating    Feeling dizzy    Nausea, vomiting and dry mouth    What else should I know about opioids?  When someone takes opioids for too long or too  often, they become dependent. This means that if you stop or reduce the medicine too quickly, you will have withdrawal symptoms.    Dependence is not the same as addiction. Addiction is when people keep using a substance that harms their body, their mind or their relations with others. If you have a history of drug or alcohol abuse, taking opioids can cause a relapse.    Over time, opioids dont work as well. Most people will need higher and higher doses. The higher the dose, the more serious the side effects. We dont know the long-term effects of opioids.      Prescribed opioids aren't the best way to manage chronic pain    Other ways to manage pain include:      Ibuprofen or acetaminophen.  You should always try this first.      Treat health problems that may be causing pain.      acupuncture or massage, deep breathing, meditation, visual imagery, aromatherapy.      Use heat or ice at the pain site      Physical therapy and exercise      Stop smoking      See a counselor or therapist                                                  People who have used opioids for a long time may have a lower quality of life, worse depression, higher levels of pain and more visits to doctors.    Never share your opioids with others. Be sure to store opioids in a secure place, locked if possible.Young children can easily swallow them and overdose.     You can overdose on opioids.  Signs of overdose include decrease or loss of consciousness, slowed breathing, trouble waking and blue lips.  If someone is worried about overdose, they should call 911.    If you are at risk for overdose, you may get naloxone (Narcan, a medicine that reverses the effects of opioids.  If you overdose, a friend or family member can give you Narcan while waiting for the ambulance.  They need to know the signs of overdose and how to give Narcan.    While you're taking opioids:    Don't use alcohol or street drugs. Taking them together can cause  death.    Don't take any of these medicines unless your doctor says its okay.  Taking these with opioids can cause death.    Benzodiazepines (such as lorazepam         or diazepam)    Muscle relaxers (such as cyclobenzaprine)    sleeping pills    other opioids    Safe disposal of opioids  Find your area drug take-back program, your pharmacy mail-back program, buy a special disposal bag (such as Deterra) from your pharmacy or flush them down the toilet.  Use the guidelines at:  www.fda.gov/drugs/resourcesforyou

## 2021-06-18 NOTE — PROGRESS NOTES
"Suad Gongora who presents today with a chief complaint of consult, mouth sores      Joint Pains: Yes  Location:  Upper back, low back, left hip.   Onset: 1 yr  Intensity:  5/10  AM Stiffness: none  Alleviating/Aggravating Factors: Tolerating Meds: yes  Other: legs sometimes give out, fell in 10/2017.  No trauma to low back. Sores started in 2003 and lately once a month.       ROS:  Patient denies having any dry eyes, dry mouth, +oral ulcers, no: alopecia, +rashes, photosensitivity, ? history of psoriasis, no: chest pain, shortness of breath, cough, dysuria, history of kidney stones,  abdominal pain, diarrhea, hematochezia, dysphagia, history of peptic ulcer disease, history of HIV, tuberculosis, hepatitis B or C, Lyme disease, seizure history, ?raynaud's, no: fevers, +recent infections (uti, s/p tx), +difficulty sleeping, + involuntary weight loss, +weakness in legs, + fatigue, +depression, +anxiety, +loss of appetite, no: numbness and tingling, history of miscarriages, + headaches (\"mri ordered\", sinusitis,  double vision, loss of vision or painful vision.      Problem List:  Patient Active Problem List   Diagnosis     Skin: A Rash     Vitamin D Deficiency     Iron deficiency anemia     Recurrent Aphthous Ulcer     Depression     Herpes Simplex     Abdominal pain     Elevated antinuclear antibody (ELDA) level        PMH:   No past medical history on file.    Surgical History:  No past surgical history on file.    Family History:  No family history on file.    Social History:   reports that she quit smoking about 12 months ago. She has never used smokeless tobacco.    Allergies:  Allergies   Allergen Reactions     Gluten Other (See Comments)        Current Medications:  Current Outpatient Prescriptions   Medication Sig Dispense Refill     LIDOCAINE 2 % solution RINSE AND CRGLE WITH 5 ML BY MOUTH EVERY 2 HOURS AS NEEDED 200 mL 0     oxyCODONE-acetaminophen (PERCOCET)  mg per tablet Take 1 tablet by mouth every " "6 (six) hours as needed for pain. 20 tablet 0     No current facility-administered medications for this visit.            Physical Exam:  BP 98/62  Pulse (!) 111  Resp 10  Ht 5' 6\" (1.676 m)  Wt 127 lb (57.6 kg)  LMP 05/13/2018  SpO2 98%  BMI 20.5 kg/m2  General: A & O x 3 in NAD  HEENT: EOMI, Non injected/non icteric sclera, several ulcerating lesions noted involving lips, palate and buccal mucosa.  Neck: Supple, no cervical LAD or thyromegaly noted  Derm: No malar rash, psoriatic lesions or nail pitting appreciated, scarring noted involving upper extremities, 2 active hyperpigmented annular macular skin lesions with erythematous base noted involving right thigh   CV: s1s2 with RRR, no rubs appreciated   Lungs: CTA B/L, no wheezing , rales or rhonci appreciated  GI: Soft, NT/ND, no rebound, no guarding noted, no hepatomegally appreciated  MS: Mild discomfort involving right second PIP joint on palpation, no clear signs of synovitis noted.  Passive range of motion testing of ankles, knees, hips did not reproduce any pains.  Insignificant amount of myofascial tender points on exam.  Otherwise patient demonstrated good passive/active ROM over other joints with no warmth, erythema, tenderness or synovitis noted over these joints.  Back: negative straight leg raising, palpating vertebral/paravertebral thoracic and lumbar spine regions did not reproduce any pains.  No SI joint pain on palpation.  Neuro: 5/5 strength in upper and lower extremities b/l, good sensation b/l,  1+ bicep and patella reflexes b/l      Summary/Assessment:    37-year-old female presents with recurrent mouth sores/ulcers.  States onset 2003 worsening over the past year.  Lately having recurrent sores monthly.  States for first time prednisone 5 mg tried which was helpful.  Claims acyclovir worsens pains although diagnosed with oral herpes in the past (\"via a swab, while in [place]\").    Has lidocaine rinse which also provides some " "benefit.    Patient has active skin lesion involving the right thigh.      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    Noted to have chronic anemia.    Patient also complains of having occasional upper back/low back pains.      Describes having some lower extremity weakness with her legs giving out at times however demonstrated good strength on exam testing today.    No clear signs of synovitis noted on exam today.    Given the above, it is possible that her aphthous ulcers and skin lesions may be related to a connective tissue disease particularly lupus.        Please see below for management plan.      Pertinent rheumatology/past medical history (please refer to above for more detailed history):      Recurrent aphthous ulcers    Skin lesions/rashes    Positive ELDA (weakly positive)    Chronic anemia (\"iron deficiency\")    Chronic low back pain, nonradiating     Chronic upper back pain    Sensations of leg weakness bilaterally     History of oral herpes    Smoking history (is cutting back)    Anxiety/depression    Rheumatology medications provided/suggested:    Prednisone taper  Plaquenil  Prilosec      Pertinent medication from other providers or from otc (please refer to above for more detailed med list):    Oral lidocaine rinse  Percocet (brief course)  IUD    Pertinent medications already tried:     Acyclovir (\"worsening of oral ulcers\")      Pertinent lab history:    Weakly positive ELDA    Pertinent imaging/test history:    Skin biopsy from 2012: 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  States her oral ulcers improved with pregnancy and resurface soon after.  Other children are older ages 18, 14 and 10.  Currently unemployed.  Raising children alone.  Has family in the " area.    Plan:      We will provide prednisone taper starting at 40 mg daily and decreasing every 2 weeks by 10 mg, remaining on 10 mg until further assessed.    We will add Plaquenil 200 mg twice a day.  Follow-up with ophthalmology every 6 months to 1 year.    We will add Prilosec 20 mg daily for GI prophylaxis while on prednisone.    We will x-ray thoracic and lumbar spine.    Continue oral lidocaine rinse as needed.    We will obtain some autoimmune/inflammatory markers and correlate clinically.    Follow-up in 2-4 weeks.  Emphasized importance the patient to maintain follow-up appointments.      Procedure note:     Spent 65 minutes with greater than half of this time spent with the patient going over differential diagnosis, prognosis, treatment plan, medication side effects and  answering questions.    Major side effect profile of medications provided/suggested were discussed with the patient.    This note was transcribed using Dragon voice recognition software as a result unintentional grammatical errors or word substitutions may have occurred. Please contact our Rheumatology department if you need any clarification or if you have any related inquiries.    Thank you for referring this patient to our clinic.      Jake Burroughs ....................  6/5/2018   1:56 PM

## 2021-06-18 NOTE — PROGRESS NOTES
[facility]  OFFICE VISIT - FAMILY MEDICINE     ASSESSMENT AND PLAN     1. Pain in both lower legs  Following with rheumatology. Requests refill percocet today to help with pain. Prescription drug monitoring reviewed today. Will refill short term supply but needs to review any ongoing medication with Dr. Dawson. Patient also advised to contact her rheumatologist if her pain symptoms have changed or worsened.    2. 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 Herpes simplex virus infection  Sores today to groin, buttocks. Treat for acute outbreak. Notify clinic if symptoms worsen or do not resolve.   valACYclovir (VALTREX) 1000 MG tablet       CHIEF COMPLAINT     medication refill (oxycodone)    HPI     Suad Gongora is a 37 y.o. female with past medical history as below who presents today for follow up visit. Seen in ED for severe pain to legs; also reports pain to entire L side. Had US to r/o blood clot which was negative. Prescribed short course of percocet which patient has been taking as prescribed to help with pain. Reports pain 8-10/10. No numbness, tingling or weakness to legs. Following with rheumatology and currently in process of prednisone taper. Has not contacted rheumatology regarding her pain. Also has upcoming follow up with nephrology and hematology. Will be seeing Dr. Dawson on Friday as well for follow up.       Also reports has noticed painful sores to groin and buttocks. Does have hx of herpes. Not on any treatment currently.       Review of Systems  12-point review of systems completed and as per HPI, otherwise negative.     PMSH   No past medical history on file.  No past surgical history on file.    PFSH   No family history on file.  Social History     Social History     Marital status: Single     Spouse name: N/A     Number of children: N/A     Years of education: N/A     Occupational History     Not  "on file.     Social History Main Topics     Smoking status: Former Smoker     Quit date: 5/12/2017     Smokeless tobacco: Never Used     Alcohol use Not on file     Drug use: Not on file     Sexual activity: Not on file     Other Topics Concern     Not on file     Social History Narrative     Relevant history was reviewed with the patient today, unless noted in HPI, is not pertinent for this visit.    MEDICATIONS     Current Outpatient Prescriptions on File Prior to Visit   Medication Sig Dispense Refill     hydroxychloroquine (PLAQUENIL) 200 mg tablet Take 1 tablet p.o. twice daily. 60 tablet 2     LIDOCAINE 2 % solution RINSE AND CRGLE WITH 5 ML BY MOUTH EVERY 2 HOURS AS NEEDED 200 mL 0     omeprazole (PRILOSEC) 20 MG capsule Take 1 tablet daily while on prednisone. 30 capsule 2     oxyCODONE-acetaminophen (PERCOCET)  mg per tablet Take 1 tablet by mouth every 6 (six) hours as needed for pain. 20 tablet 0     predniSONE (DELTASONE) 10 mg tablet Take 4 tabs p.o. daily for 2 weeks, then decrease by 1 tab every 2 wks, do not decrease further than 1 tablet (10 mg) daily. 140 tablet 0     No current facility-administered medications on file prior to visit.        OBJECTIVE   /80 (Patient Site: Right Arm, Patient Position: Sitting, Cuff Size: Adult Regular)  Pulse 88  Temp 98.8  F (37.1  C) (Oral)   Ht 5' 6\" (1.676 m)  Wt 140 lb (63.5 kg)  LMP 06/10/2018  BMI 22.6 kg/m2    Physical Exam  Physical Examination: General appearance - alert, well appearing, and in no distress  Chest - clear to auscultation, no wheezes, rales or rhonchi, symmetric air entry  Heart - normal rate, regular rhythm, normal S1, S2, no murmurs, rubs, clicks or gallops  Neurological - alert, oriented, normal speech, no focal findings or movement disorder noted  Musculoskeletal - no joint tenderness, deformity or swelling  Extremities - peripheral pulses normal, no pedal edema, no clubbing or cyanosis  Skin - patches of clustered " vesicles to mons pubis and buttocks. No extending redness, swelling, warmth or drainage.       RESULTS/CONSULTS (Lab/Radiology)   No results found for this or any previous visit (from the past 168 hour(s)).    HEALTH MAINTENANCE / SCREENING     PHQ-2 Total Score: 0 (4/23/2018 10:57 AM), PHQ-9 Total Score: 2 (4/23/2018 10:57 AM),FRANK-7 Total: 4 (10/3/2017  1:00 PM)    Health Maintenance   Topic Date Due     DEPRESSION FOLLOW UP  1979     TDAP ADULT ONE TIME DOSE  08/07/1997     ADVANCE DIRECTIVES DISCUSSED WITH PATIENT  03/05/2015     TD 18+ HE  03/01/2016     PAP SMEAR  10/11/2016     INFLUENZA VACCINE RULE BASED (Season Ended) 08/01/2018     Total time was 25 minutes, greater than 50% counseling and coordinating care regarding the above issues.    Katherin Frank, CNP  Family Medicine, [facility]

## 2021-06-18 NOTE — LETTER
Letter by Flakita Dawson MD at      Author: Flakita Dawson MD Service: -- Author Type: --    Filed:  Encounter Date: 2/8/2019 Status: (Other)       Suad Gongora  [address]  [place] [address]      February 8, 2019      Dear Ms. Gongora,     At [facility], we care about your health and well-being. Your primary care provider is committed to ensuring you receive high quality care and has chosen a network of specialists to assist in providing that care. Recently Dr. Dawson referred you to have an US PELVIS WITH TRANSVAGINAL NON OB for specialty care.        It is important to your overall health to follow through with the recommendation from your provider. Please call [phone] at your earliest convenience for assistance in scheduling an appointment.  If you have already scheduled this appointment, please disregard this notice.        Sincerely,        [facility] Specialty Scheduling

## 2021-06-18 NOTE — PROGRESS NOTES
"SUBJECTIVE: Suad Gongora is a 37 y.o. female with:  Chief Complaint   Patient presents with     Out break on mouth x 2 weeks     Need medication     Medication Refill    1) UTI- She had UTI with E coli about 3 weeks ago.  She was also experiencing some abdominal / pelvic pain. She took antibiotics and abdominal pain is better.      2) Anemia - She feels nausea when she takes the OTC iron pills.  She is having a hard time keeping them down.  She continues to experience fatigue.    3) Outbreaks of ulcers on her lips / mouth - She has upcoming appointment with rheumatology.  She had a biopsy of rash on her arm back in 2012 that was suspicious for lupus.  Also had positive ELDA in the past.  She has missed numerous rheumatology appointments. Her ulcers respond to prednisone.  She also does better when she is off gluten/ dairy.  When she gets flares, her mouth is extremely painful and she uses lidocaine topically and Percocet for pain.  She has been getting about one flare a month.  Has recent flare and lips are bleeding.  This condition makes it very hard to seek work.  She is going to try and apply for disability so she can finally take care of this issue.    4) Let weakness- She has MRI of the brain scheduled.    OBJECTIVE: BP 92/62 (Patient Site: Right Arm, Patient Position: Sitting, Cuff Size: Adult Regular)  Pulse 72  Ht 5' 6\" (1.676 m)  Wt 131 lb (59.4 kg)  LMP 05/13/2018  BMI 21.14 kg/m2 no distress  OP: Her lips are swollen with ulcers present.    Lab Results   Component Value Date    WBC 4.4 04/23/2018    HGB 8.6 (L) 04/23/2018    HCT 26.7 (L) 04/23/2018    MCV 80 04/23/2018     (H) 04/23/2018     Ferritin- 11  ELDA 1.1- weakly positive    Suad was seen today for out break on mouth x 2 weeks and medication refill.    Diagnoses and all orders for this visit:    Iron deficiency anemia, unspecified iron deficiency anemia type- She has long standing anemia/ elevated platelets.  I think she could benefit " from IV iron since she does not tolerate oral iron.  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  She is advised to keep upcoming rheumatology appointment.  -     oxyCODONE-acetaminophen (PERCOCET)  mg per tablet; Take 1 tablet by mouth every 6 (six) hours as needed for pain.  -     viscous lidocaine HC (LIDOCAINE) 2 % Soln viscous solution; RINSE AND GARGLE WITH ONE TEASPOONFUL(5ML) EVERY TWO HOURS AS NEEDED     Flakita Dawson

## 2021-06-18 NOTE — PROGRESS NOTES
Patient has been scheduled and rescheduled three times. A certified letter will be sent to patient indicating that she may need to seek a different hematology center to address her anemia.

## 2021-06-19 NOTE — LETTER
Letter by Flakita Dawson MD at      Author: Flakita Dawson MD Service: -- Author Type: --    Filed:  Encounter Date: 8/27/2019 Status: (Other)         Suad Gongora  [address]  [place] [address]                     August 28, 2019    Dear Suad:    Below is the result of the Tb screening test.  This came back negative.      Lab on 08/19/2019   Component Date Value Ref Range Status   ? QTF RESULT 08/19/2019 Negative  Negative Final   ? QTF INTREPRETATION 08/19/2019 No interferon-gamma response to M. tuberculosis antigens was detected.  Infecton with M. tuberculosis is unlikely.  A negative result alone does not exclude infection with M. tuberculosis   Final   ? QTF NIL 08/19/2019 0.02  IU/mL Final   ? QTF ANTIGEN TB1-NIL 08/19/2019 0.01  IU/mL Final   ? QTF ANTIGEN TB2 - NIL 08/19/2019 0.02  IU/mL Final   ? QTF MITOGEN-NIL 08/19/2019 6.60  IU/mL Final       If you have any questions or concerns, please don't hesitate to call.    Sincerely,        Electronically signed by Flakita Dawson MD

## 2021-06-19 NOTE — LETTER
Letter by Flakita Dawson MD at      Author: Flakita Dawson MD Service: -- Author Type: --    Filed:  Encounter Date: 7/2/2019 Status: (Other)         July 5, 2019     Patient: Suad Gongora   YOB: 1980   Date of Visit: 7/2/2019       To Whom It May Concern:    It is my medical opinion that Suad Gongora was unable to work from 6/29/19 thru 7/3/19 due to illness..    If you have any questions or concerns, please don't hesitate to call.    Sincerely,        Electronically signed by Flakita Dawson MD

## 2021-06-19 NOTE — LETTER
Letter by Flakita Dawson MD at      Author: Flakita Dawson MD Service: -- Author Type: --    Filed:  Encounter Date: 10/1/2019 Status: Signed       Suad Gongora  [address]  [place] [address]    October 1, 2019    Dear Suad    In reviewing your records, we have determined a gap in your preventive services. Based on your age and health history, we recommend the follow service.     ? General Physical  ? Physical with a Pap Smear  ? Colon cancer screening  ? Mammogram  ? Immunization  ? Diabetic check  ? Blood pressure/cardiovascular check  ? Asthma check  ? Cholesterol test  ? Lab work  ? Med check    If you have had the service elsewhere, please contact us so we can update our records. Please let us know if you have transferred your care to another clinic.    Please call [phone] to schedule this appointment.    We believe that a strong preventive care program, including regular physicals and follow-up care is an important part of a healthy lifestyle and we are committed to helping you maintain your health.    Thank you for choosing us as your health care provider.    Sincerely,   [facility]  [address] [facility] [address]  Phone Number:  [phone]

## 2021-06-19 NOTE — LETTER
Letter by Flakita Dawson MD at      Author: Flakita Dawson MD Service: -- Author Type: --    Filed:  Encounter Date: 4/26/2019 Status: (Other)         [facility]  04/26/19    Patient: Suad Gongora  YOB: 1980  Medical Record Number: [id number]                                                                  Opioid / Opioid Plus Controlled Substance Agreement    I understand that my care provider has prescribed an opioid (narcotic) controlled substance to help manage my condition(s). I am taking this medicine to help me function or work. I know this is strong medicine, and that it can cause serious side effects. Opioid medicine can be sedating, addicting and may cause a dependency on the drug. They can affect my ability to drive or think, and cause depression. They need to be taken exactly as prescribed. Combining opioids with certain medicines or chemicals (such as cocaine, sedatives and tranquilizers, sleeping pills, meth) can be dangerous or even fatal. Also, if I stop opioids suddenly, I may have severe withdrawal symptoms. Last, I understand that opioids do not work for all types of pain nor for all patients. If not helpful, I may be asked to stop them.    The risks, benefits, and side effects of these medicine(s) were explained to me. I agree that:    1. I will take part in other treatments as advised by my care team. This may be psychiatry or counseling, physical therapy, behavioral therapy, group treatment or a referral to a pain clinic. I will reduce or stop my medicine when my care team tells me to do so.  2. I will take my medicines as prescribed. I will not change the dose or schedule unless my care team tells me to. There will be no refills if I run out early.  I may be contactedwithout warning and asked to complete a urine drug test or pill count at any time.   3. I will keep all my appointments, and understand this is part of the monitoring  of opioids. My care team may require an office visit for EVERY opioid/controlled substance refill. If I miss appointments or dont follow instructions, my care team may stop my medicine.  4. I will not ask other providers to prescribe controlled substances, and I will not accept controlled substances from other people. If I need another prescribed controlled substance for a new reason, I will tell my care team within 1 business day.  5. I will use one pharmacy to fill all of my controlled substance prescriptions, and it is up to me to make sure that I do not run out of my medicines on weekends or holidays. If my care team is willing to refill my opioid prescription without a visit, I must request refills only during office hours, refills may take up to 3 days to process, and it may take up to 5 to 7 days for my medicine to be mailed and ready at my pharmacy. Prescriptions will not be mailed anywhere except my pharmacy.        [id number]  Rev 12/18         Registration to scan to EHR                             Page 1 of 2               Controlled Substance Agreement Opioid        [facility]  04/26/19  Patient: Suad Gongora  YOB: 1980  Medical Record Number: [id number]                                                                  6. I am responsible for my prescriptions. If the medicine/prescription is lost or stolen, it will not be replaced. I also agree not to share controlled substance medicines with anyone.  7. I agree to not use ANY illegal or recreational drugs. This includes marijuana, cocaine, bath salts or other drugs. I agree not to use alcohol unless my care team says I may.          I agree to give urine samples whenever asked. If I dont give a urine sample, the care team may stop my medicine.    8. If I enroll in the [place] Medical Marijuana program, I will tell my care team. I will also sign an agreement to share my medical records with my care team.   9. I will  bring in my list of medicines (or my medicine bottles) each time I come to the clinic.   10. I will tell my care team right away if I become pregnant or have a new medical problem treated outside of my regular clinic.  11. I understand that this medicine can affect my thinking and judgment. It may be unsafe for me to drive, use machinery and do dangerous tasks. I will not do any of these things until I know how the medicine affects me. If my dose changes, I will wait to see how it affects me. I will contact my care team if I have concerns about medicine side effects.    I understand that if I do not follow any of the conditions above, my prescriptions or treatment may be stopped.      I agree that my provider, clinic care team, and pharmacy may work with any city, state or federal law enforcement agency that investigates the misuse, sale, or other diversion of my controlled medicine. I will allow my provider to discuss my care with or share a copy of this agreement with any other treating provider, pharmacy or emergency room where I receive care. I agree to give up (waive) any right of privacy or confidentiality with respect to these consents.       Medicine is oxycodone / APAP 10 / 325 mg 1/2 -1 every 6 hours for mouth pain.  #20 / 1 month.    Follow-up every 3 months for rechecks.    I have read this agreement and have asked questions about anything I did not understand.      ________________________________________________________________________  Patient signature - Date/Time -  Suad M Rolan                                      ________________________________________________________________________  Witness signature                                                            ________________________________________________________________________  Provider luis Dawson MD      [id number]  Rev 12/18         Registration to scan to EHR                         Page 2 of 2                    Controlled Substance Agreement Opioid           Page 1 of 2  Opioid Pain Medicines (also known as Narcotics)  What You Need to Know    What are opioids?   Opioids are pain medicines that must be prescribed by a doctor.  They are also known as narcotics.    Examples are:     morphine (MS Contin, Magdalena)    oxycodone (Oxycontin)    oxycodone and acetaminophen (Percocet)    hydrocodone and acetaminophen (Vicodin, Norco)     fentanyl patch (Duragesic)     hydromorphone (Dilaudid)     methadone     What do opioids do well?   Opioids are best for short-term pain after a surgery or injury. They also work well for cancer pain. Unlike other pain medicines, they do not cause liver or kidney failure or ulcers. They may help some people with long-lasting (chronic) pain.     What do opioids NOT do well?   Opioids never get rid of pain entirely, and they do not work well for most patients with chronic pain. Opioids do not reduce swelling, one of the causes of pain. They also dont work well for nerve pain.                           For informational purposes only.  Not to replace the advice of your care provider.  Copyright [address] [facility]. All right reserved. [employer] 108510-Nhe 02/18.      Page 2 of 2    Risks and side effects   Talk to your doctor before you start or decide to keep taking one of these medicines. Side effects include:    Lowering your breathing rate enough to cause death    Overdose, including death, especially if taking higher than prescribed doses    Long-term opioid use    Worse depression symptoms; less pleasure in things you usually enjoy    Feeling tired or sluggish    Slower thoughts or cloudy thinking    Being more sensitive to pain over time; pain is harder to control    Trouble sleeping or restless sleep    Changes in hormone levels (for example, less testosterone)    Changes in sex drive or ability to have sex    Constipation    Unsafe driving    Itching and sweating    Feeling  dizzy    Nausea, vomiting and dry mouth    What else should I know about opioids?  When someone takes opioids for too long or too often, they become dependent. This means that if you stop or reduce the medicine too quickly, you will have withdrawal symptoms.    Dependence is not the same as addiction. Addiction is when people keep using a substance that harms their body, their mind or their relations with others. If you have a history of drug or alcohol abuse, taking opioids can cause a relapse.    Over time, opioids dont work as well. Most people will need higher and higher doses. The higher the dose, the more serious the side effects. We dont know the long-term effects of opioids.      Prescribed opioids aren't the best way to manage chronic pain    Other ways to manage pain include:      Ibuprofen or acetaminophen.  You should always try this first.      Treat health problems that may be causing pain.      acupuncture or massage, deep breathing, meditation, visual imagery, aromatherapy.      Use heat or ice at the pain site      Physical therapy and exercise      Stop smoking      See a counselor or therapist                                                  People who have used opioids for a long time may have a lower quality of life, worse depression, higher levels of pain and more visits to doctors.    Never share your opioids with others. Be sure to store opioids in a secure place, locked if possible.Young children can easily swallow them and overdose.     You can overdose on opioids.  Signs of overdose include decrease or loss of consciousness, slowed breathing, trouble waking and blue lips.  If someone is worried about overdose, they should call 911.    If you are at risk for overdose, you may get naloxone (Narcan, a medicine that reverses the effects of opioids.  If you overdose, a friend or family member can give you Narcan while waiting for the ambulance.  They need to know the signs of overdose and how  to give Narcan.    While you're taking opioids:    Don't use alcohol or street drugs. Taking them together can cause death.    Don't take any of these medicines unless your doctor says its okay.  Taking these with opioids can cause death.    Benzodiazepines (such as lorazepam         or diazepam)    Muscle relaxers (such as cyclobenzaprine)    sleeping pills    other opioids    Safe disposal of opioids  Find your area drug take-back program, your pharmacy mail-back program, buy a special disposal bag (such as Deterra) from your pharmacy or flush them down the toilet.  Use the guidelines at:  www.fda.gov/drugs/resourcesforyou

## 2021-06-19 NOTE — LETTER
Letter by Flakita Dawson MD at      Author: Flakita Dawson MD Service: -- Author Type: --    Filed:  Encounter Date: 7/2/2019 Status: (Other)         July 10, 2019     Patient: Suad Gongora   YOB: 1980   Date of Visit: 7/2/2019       To Whom It May Concern:    It is my medical opinion that Suad Gongora should remain out of work until from 6/29/19 thru 7/7/2019 due to medical reasons..    If you have any questions or concerns, please don't hesitate to call.    Sincerely,        Electronically signed by Flakita Dawson MD

## 2021-06-19 NOTE — PROGRESS NOTES
Suadeverardo Gongora who presents today with a chief complaint of Follow-up oral lesions      Joint Pains: yes  Location: low back, knees, upper back  Onset: chronic  Intensity:  7/10  AM Stiffness: 1 Minute  Alleviating/Aggravating Factors: prednisone helpful  Tolerating Meds: yes  Other: oral ulcers improved with prednisone.  Denies having any genital lesions at this time, states had in the past.      ROS:  Patient denies having any chest pain, shortness of breath, cough, abdominal pain, nausea, vomiting, rashes, fevers, oral ulcers and recent infections.  Patient admits to having a good appetite      Problem List:  Patient Active Problem List   Diagnosis     Skin: A Rash     Vitamin D Deficiency     Iron deficiency anemia     Recurrent Aphthous Ulcer     Depression     Herpes Simplex     Abdominal pain     Elevated antinuclear antibody (ELDA) level     Lupus        PMH:   No past medical history on file.    Surgical History:  No past surgical history on file.    Family History:  No family history on file.    Social History:   reports that she quit smoking about 13 months ago. She has never used smokeless tobacco.    Allergies:  Allergies   Allergen Reactions     Gluten Other (See Comments)        Current Medications:  Current Outpatient Prescriptions   Medication Sig Dispense Refill     hydroxychloroquine (PLAQUENIL) 200 mg tablet Take 1 tablet p.o. twice daily. 60 tablet 2     LIDOCAINE 2 % solution RINSE AND CRGLE WITH 5 ML BY MOUTH EVERY 2 HOURS AS NEEDED 200 mL 0     omeprazole (PRILOSEC) 20 MG capsule Take 1 tablet daily while on prednisone. 30 capsule 2     oxyCODONE-acetaminophen (PERCOCET)  mg per tablet Take 1 tablet by mouth every 6 (six) hours as needed for pain. 20 tablet 0     predniSONE (DELTASONE) 10 mg tablet Take 4 tabs p.o. daily for 2 weeks, then decrease by 1 tab every 2 wks, do not decrease further than 1 tablet (10 mg) daily. 140 tablet 0     No current facility-administered medications for  "this visit.            Physical Exam:  /68  Pulse 68  Ht 5' 6\" (1.676 m)  Wt 140 lb (63.5 kg)  LMP 06/10/2018  BMI 22.6 kg/m2  General: A & O x 3 in NAD  HEENT: EOMI, Non injected/non icteric sclera, no oral or perioral lesions/ulcers noted today.  Dermatology: Hyperpigmented macular skin lesions noted involving upper extremities.  CV: s1s2 with RRR, no rubs appreciated   Lungs: CTA B/L, no wheezing , rales or rhonci appreciated  GI: Soft, NT/ND, no rebound, no guarding noted  MS: Palpating vertebral/paravertebral thoracic/lumbar spine regions and SI joint regions did not reproduce any pains, negative straight leg raising bilaterally.  Otherwise patient demonstrated good passive/active ROM over other joints with no warmth, erythema, tenderness or synovitis noted over these joints.    Summary/Assessment:    History that includes recurrent aphthous ulcers and back pains.    Presents for follow-up visit and is doing significantly better with prednisone taper currently at 20 mg daily starting at 40 mg daily about 4-5 weeks ago.    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    Also in the differential is herpes 1 and 2 given positive antibodies (both IgM and IgG when having active flare on last visit).      Patient states that she has been experiencing aphthous ulcer lesions almost monthly for several years typically self-limiting after about a week or 2 however, on last visit lesions persisted until prednisone started.    States also had genital lesions in the past, which responded to valacyclovir.  However on last presentation valacyclovir exacerbated her oral lesions.    Hence, given the above, perhaps prior lesions related to herpes and now may have superimposed lupus.    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    No clear signs of synovitis noted on exam today.    Please see below for management plan.      From prior note:  37-year-old female presents with recurrent mouth sores/ulcers.  States onset 2003 worsening over the past year.  Lately having recurrent sores monthly.    Claims acyclovir worsens pains, although diagnosed with oral herpes in the past (\"via a swab, while in [place]\").    Has lidocaine rinse which also provides some benefit.    Patient has active skin lesion involving the right thigh.      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      Pertinent rheumatology/past medical history (please refer to above for more detailed history):      Recurrent aphthous ulcers (possibly lupus versus UCTD and/or herpes related)    Oral/genital herpes (positive IgM/IgG herpes 1 and 2)    Skin lesions/rashes    Positive ELDA (weakly positive)    Chronic anemia (\"iron deficiency\")    Chronic low back pain, nonradiating     Chronic upper back pain    Sensations of leg weakness bilaterally     Smoking history (is cutting back)    Anxiety/depression    Rheumatology medications provided/suggested:    Prednisone taper  Plaquenil  Prilosec      Pertinent medication from other providers or from otc (please refer to above for more detailed med list):    Oral lidocaine rinse  Percocet (brief course)  IUD    Pertinent medications already tried:     Acyclovir (\"worsening of oral ulcers\")      Pertinent lab history:    Weakly positive ELDA    Elevated ESR/CRP levels.    Low hemoglobin/hematocrit    Positive herpes 1 and 2 IgG antibodies  Positive herpes IgM " antibody    Noted to have negative/unremarkable: ELDA related subsets, ACE level, ANCA, HLA-B27, cardiolipin antibodies, lupus anticoagulant.    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  States her oral ulcers improved with pregnancy and resurface soon after.  Other children are older ages 18, 14 and 10.  Currently unemployed.  Raising children alone.  Has family in the area.    States has already seen infectious disease and was informed to see rheumatology.      Plan:      Continue prednisone 20 mg daily for another 10 days thereafter take 15 mg daily for 3 weeks then 10 mg daily for 3 weeks then remain on 5 mg daily.    Continue Plaquenil 200 mg twice a day.    Regarding microalbuminuria, has appointment with nephrology later this month, recommended she follow through with this visit.    We will refer to spine clinic.    If necessary, continue oral lidocaine rinse as needed.    Follow-up in 2-3 months.      Procedure note:     Spent 45 minutes with greater than half of this time spent with the patient going over differential diagnosis, prognosis, treatment plan, medication side effects and  answering questions.    Major side effect profile of medications provided/suggested were discussed with the patient.    This note was transcribed using Dragon voice recognition software as a result unintentional grammatical errors or word substitutions may have occurred. Please contact our Rheumatology department if you need any clarification or if you have any related inquiries.        Jake Burroughs ....................  7/10/2018   3:18 PM

## 2021-06-20 ENCOUNTER — COMMUNICATION - HEALTHEAST (OUTPATIENT)
Dept: FAMILY MEDICINE | Facility: CLINIC | Age: 41
End: 2021-06-20

## 2021-06-20 DIAGNOSIS — M32.9 DISEASE OF GINGIVA DUE TO LUPUS ERYTHEMATOSUS (H): ICD-10-CM

## 2021-06-20 DIAGNOSIS — K06.9 DISEASE OF GINGIVA DUE TO LUPUS ERYTHEMATOSUS (H): ICD-10-CM

## 2021-06-20 NOTE — LETTER
Letter by Flakita Dawson MD at      Author: Flakita Dawson MD Service: -- Author Type: --    Filed:  Encounter Date: 1/20/2020 Status: Signed                    My Depression Action Plan  Name: Suad Gongora   Date of Birth 1980  Date: 1/20/2020    My Doctor: Flakita Dawson MD   My Clinic: [facility]  [address] [address]  [phone]          GREEN    ZONE   Good Control    What it looks like:     Things are going generally well. You have normal ups and downs. You may even feel depressed from time to time, but bad moods usually last less than a day.   What you need to do:  1. Continue to care for yourself (see self care plan)  2. Check your depression survival kit and update it as needed  3. Follow your physicians recommendations including any medication.  4. Do not stop taking medication unless you consult with your physician first.           YELLOW         ZONE Getting Worse    What it looks like:     Depression is starting to interfere with your life.     It may be hard to get out of bed; you may be starting to isolate yourself from others.    Symptoms of depression are starting to last most all day and this has happened for several days.     You may have suicidal thoughts but they are not constant.   What you need to do:     1. Call your care team. Your response to treatment will improve if you keep your care team informed of your progress. Yellow periods are signs an adjustment may need to be made.     2. Continue your self-care.  Just get dressed and ready for the day.  Don't give yourself time to talk yourself out of it.    3. Talk to someone in your support network.    4. Open up your depression Depression Self-Care Plan / Wellness kit.           RED    ZONE Medical Alert - Get Help    What it looks like:     Depression is seriously interfering with your life.     You may experience these or other symptoms: You cant get out  of bed most days, cant work or engage in other necessary activities, you have trouble taking care of basic hygiene, or basic responsibilities, thoughts of suicide or death that will not go away, self-injurious behavior.     What you need to do:  1. Call your care team and request a same-day appointment. If they are not available (weekends or after hours) call your local crisis line, emergency room or 911.            Self-Care Plan / Wellness Kit    Self-Care for Depression  Heres the deal. Your body and mind are really not as separate as most people think.  What you do and think affects how you feel and how you feel influences what you do and think. This means if you do things that people who feel good do, it will help you feel better.  Sometimes this is all it takes.  There is also a place for medication and therapy depending on how severe your depression is, so be sure to consult with your medical provider and/ or Behavioral Health Consultant if your symptoms are worsening or not improving.     In order to better manage my stress, I will:    Exercise  Get some form of exercise, every day. This will help reduce pain and release endorphins, the feel good chemicals in your brain. This is almost as good as taking antidepressants!  This is not the same as joining a gym and then never going! (they count on that by the way?) It can be as simple as just going for a walk or doing some gardening, anything that will get you moving.      Hygiene   Maintain good hygiene (get out of bed in the morning, make your bed, brush your teeth, take a shower, and get dressed like you were going to work, even if you are unemployed).  If your clothes don't fit try to get ones that do.    Diet  Strive to eat foods that are good for me, drink plenty of water, and avoid excessive sugar, caffeine, alcohol, and other mood-altering substances.  Some foods that are helpful in depression are: complex carbohydrates, B vitamins, flaxseed, fish or  fish oil, fresh fruits and vegetables.    Psychotherapy  Agree to participate in Individual Therapy (if recommended).    Medication  If prescribed medications, I agree to take them.  Missing doses can result in serious side effects.  I understand that drinking alcohol, or other illicit drug use, may cause potential side effects.  I will not stop my medication abruptly without first discussing it with my provider.    Staying Connected With Others  Stay in touch with my friends, family members, and my primary care provider/team.    Use your imagination  Be creative.  We all have a creative side; it doesnt matter if its oil painting, sand castles, or mud pies! This will also kick up the endorphins.    Witness Beauty  (AKA stop and smell the roses) Take a look outside, even in mid-winter. Notice colors, textures. Watch the squirrels and birds.     Service to others  Be of service to others.  There is always someone else in need.  By helping others we can get out of ourselves and remember the really important things.  This also provides opportunities for practicing all the other parts of the program.    Humor  Laugh and be silly!  Adjust your TV habits for less news and crime-drama and more comedy.    Control your stress  Try breathing deep, massage therapy, biofeedback, and meditation. Find time to relax each day.     Crisis Text Line  http://www.crisistextline.org    The Crisis Text Line serves anyone, in any type of crisis, providing access to free, 24/7 support and information via the medium people already use and trust:    Here's how it works:  1.  Text 236-176 from anywhere in the [place], anytime, about any type of crisis.  2.  A live, trained Crisis Counselor receives the text and responds quickly.  3.  The volunteer Crisis Counselor will help you move from a 'hot moment to a cool moment'.  My support system    Clinic Contact:  Phone number:    Contact 1:  Phone number:    Contact 2:  Phone number:     Taoist/:  Phone number:    Therapist:  Phone number:    [facility] crisis center:    Phone number:    Other community support:  Phone number:

## 2021-06-20 NOTE — LETTER
Letter by Flakita Dawson MD at      Author: Flakita Dawson MD Service: -- Author Type: --    Filed:  Encounter Date: 1/20/2020 Status: Signed         [facility]  01/20/20    Patient: Suad Gongora  YOB: 1980  Medical Record Number: [id number]                                                                  Opioid / Opioid Plus Controlled Substance Agreement    I understand that my care provider has prescribed an opioid (narcotic) controlled substance to help manage my condition(s). I am taking this medicine to help me function or work. I know this is strong medicine, and that it can cause serious side effects. Opioid medicine can be sedating, addicting and may cause a dependency on the drug. They can affect my ability to drive or think, and cause depression. They need to be taken exactly as prescribed. Combining opioids with certain medicines or chemicals (such as cocaine, sedatives and tranquilizers, sleeping pills, meth) can be dangerous or even fatal. Also, if I stop opioids suddenly, I may have severe withdrawal symptoms. Last, I understand that opioids do not work for all types of pain nor for all patients. If not helpful, I may be asked to stop them.      The risks, benefits, and side effects of these medicine(s) were explained to me. I agree that:    1. I will take part in other treatments as advised by my care team. This may be psychiatry or counseling, physical therapy, behavioral therapy, group treatment or a referral to a pain clinic. I will reduce or stop my medicine when my care team tells me to do so.  2. I will take my medicines as prescribed. I will not change the dose or schedule unless my care team tells me to. There will be no refills if I run out early.  I may be contactedwithout warning and asked to complete a urine drug test or pill count at any time.   3. I will keep all my appointments, and understand this is part of the monitoring  of opioids. My care team may require an office visit for EVERY opioid/controlled substance refill. If I miss appointments or dont follow instructions, my care team may stop my medicine.  4. I will not ask other providers to prescribe controlled substances, and I will not accept controlled substances from other people. If I need another prescribed controlled substance for a new reason, I will tell my care team within 1 business day.  5. I will use one pharmacy to fill all of my controlled substance prescriptions, and it is up to me to make sure that I do not run out of my medicines on weekends or holidays. If my care team is willing to refill my opioid prescription without a visit, I must request refills only during office hours, refills may take up to 3 days to process, and it may take up to 5 to 7 days for my medicine to be mailed and ready at my pharmacy. Prescriptions will not be mailed anywhere except my pharmacy.        [id number]  Rev 12/18         Registration to scan to EHR                             Page 1 of 2               Controlled Substance Agreement Opioid        [facility]  01/20/20  Patient: Suad Gongora  YOB: 1980  Medical Record Number: [id number]                                                                  6. I am responsible for my prescriptions. If the medicine/prescription is lost or stolen, it will not be replaced. I also agree not to share controlled substance medicines with anyone.  7. I agree to not use ANY illegal or recreational drugs. This includes marijuana, cocaine, bath salts or other drugs. I agree not to use alcohol unless my care team says I may.          I agree to give urine samples whenever asked. If I dont give a urine sample, the care team may stop my medicine.    8. If I enroll in the [place] Medical Marijuana program, I will tell my care team. I will also sign an agreement to share my medical records with my care team.   9. I will  bring in my list of medicines (or my medicine bottles) each time I come to the clinic.   10. I will tell my care team right away if I become pregnant or have a new medical problem treated outside of my regular clinic.  11. I understand that this medicine can affect my thinking and judgment. It may be unsafe for me to drive, use machinery and do dangerous tasks. I will not do any of these things until I know how the medicine affects me. If my dose changes, I will wait to see how it affects me. I will contact my care team if I have concerns about medicine side effects.    I understand that if I do not follow any of the conditions above, my prescriptions or treatment may be stopped.      I agree that my provider, clinic care team, and pharmacy may work with any city, state or federal law enforcement agency that investigates the misuse, sale, or other diversion of my controlled medicine. I will allow my provider to discuss my care with or share a copy of this agreement with any other treating provider, pharmacy or emergency room where I receive care. I agree to give up (waive) any right of privacy or confidentiality with respect to these consents.     I have read this agreement and have asked questions about anything I did not understand.    Percocet 10/325 mg Take one daily as needed for severe pain  #20 for 1 month  Visits every 3 months      ________________________________________________________________________  Patient signature - Date/Time -  Suad Gongora                                      ________________________________________________________________________  Witness signature                                                            ________________________________________________________________________  Provider signature - Flakita Dawson MD      [id number]  Rev 12/18         Registration to scan to EHR                         Page 2 of 2                   Controlled Substance Agreement  Opioid           Page 1 of 2  Opioid Pain Medicines (also known as Narcotics)  What You Need to Know    What are opioids?   Opioids are pain medicines that must be prescribed by a doctor.  They are also known as narcotics.    Examples are:     morphine (MS Contin, Magdalena)    oxycodone (Oxycontin)    oxycodone and acetaminophen (Percocet)    hydrocodone and acetaminophen (Vicodin, Norco)     fentanyl patch (Duragesic)     hydromorphone (Dilaudid)     methadone     What do opioids do well?   Opioids are best for short-term pain after a surgery or injury. They also work well for cancer pain. Unlike other pain medicines, they do not cause liver or kidney failure or ulcers. They may help some people with long-lasting (chronic) pain.     What do opioids NOT do well?   Opioids never get rid of pain entirely, and they do not work well for most patients with chronic pain. Opioids do not reduce swelling, one of the causes of pain. They also dont work well for nerve pain.                           For informational purposes only.  Not to replace the advice of your care provider.  Copyright [address] [facility]. All right reserved. [employer] 544136-Sln 02/18.      Page 2 of 2    Risks and side effects   Talk to your doctor before you start or decide to keep taking one of these medicines. Side effects include:    Lowering your breathing rate enough to cause death    Overdose, including death, especially if taking higher than prescribed doses    Long-term opioid use    Worse depression symptoms; less pleasure in things you usually enjoy    Feeling tired or sluggish    Slower thoughts or cloudy thinking    Being more sensitive to pain over time; pain is harder to control    Trouble sleeping or restless sleep    Changes in hormone levels (for example, less testosterone)    Changes in sex drive or ability to have sex    Constipation    Unsafe driving    Itching and sweating    Feeling dizzy    Nausea, vomiting and dry  mouth    What else should I know about opioids?  When someone takes opioids for too long or too often, they become dependent. This means that if you stop or reduce the medicine too quickly, you will have withdrawal symptoms.    Dependence is not the same as addiction. Addiction is when people keep using a substance that harms their body, their mind or their relations with others. If you have a history of drug or alcohol abuse, taking opioids can cause a relapse.    Over time, opioids dont work as well. Most people will need higher and higher doses. The higher the dose, the more serious the side effects. We dont know the long-term effects of opioids.      Prescribed opioids aren't the best way to manage chronic pain    Other ways to manage pain include:      Ibuprofen or acetaminophen.  You should always try this first.      Treat health problems that may be causing pain.      acupuncture or massage, deep breathing, meditation, visual imagery, aromatherapy.      Use heat or ice at the pain site      Physical therapy and exercise      Stop smoking      See a counselor or therapist                                                  People who have used opioids for a long time may have a lower quality of life, worse depression, higher levels of pain and more visits to doctors.    Never share your opioids with others. Be sure to store opioids in a secure place, locked if possible.Young children can easily swallow them and overdose.     You can overdose on opioids.  Signs of overdose include decrease or loss of consciousness, slowed breathing, trouble waking and blue lips.  If someone is worried about overdose, they should call 911.    If you are at risk for overdose, you may get naloxone (Narcan, a medicine that reverses the effects of opioids.  If you overdose, a friend or family member can give you Narcan while waiting for the ambulance.  They need to know the signs of overdose and how to give Narcan.    While you're  taking opioids:    Don't use alcohol or street drugs. Taking them together can cause death.    Don't take any of these medicines unless your doctor says its okay.  Taking these with opioids can cause death.    Benzodiazepines (such as lorazepam         or diazepam)    Muscle relaxers (such as cyclobenzaprine)    sleeping pills    other opioids    Safe disposal of opioids  Find your area drug take-back program, your pharmacy mail-back program, buy a special disposal bag (such as Deterra) from your pharmacy or flush them down the toilet.  Use the guidelines at:  www.fda.gov/drugs/resourcesforyou

## 2021-06-20 NOTE — LETTER
Letter by Flakita Dawson MD at      Author: Flakita Dawson MD Service: -- Author Type: --    Filed:  Encounter Date: 4/1/2020 Status: (Other)         April 3, 2020     Patient: Suad Gongora   YOB: 1980   Date of Visit: 4/1/2020       To Whom It May Concern:    It is my medical opinion that Suad Gongora should remain out of work until until further notice due to medical concerns..    If you have any questions or concerns, please don't hesitate to call.    Sincerely,        Electronically signed by Flakita Dawson MD

## 2021-06-20 NOTE — LETTER
Letter by Flakita Dawson MD at      Author: Flakita Dawson MD Service: -- Author Type: --    Filed:  Encounter Date: 3/30/2020 Status: (Other)         April 1, 2020     Patient: Suad Gongora   YOB: 1980   Date of Visit: 3/30/2020       To Whom It May Concern:    It is my medical opinion that Suad Gongora should remain out of work until 4/16/2020 due to medical concerns.    If you have any questions or concerns, please don't hesitate to call.    Sincerely,        Electronically signed by Flakita Dawson MD

## 2021-06-20 NOTE — LETTER
Letter by Flakita Dawson MD at      Author: Flakita Dawson MD Service: -- Author Type: --    Filed:  Encounter Date: 2020 Status: (Other)       2020   Suad Gongora    1980       To whom it may concern:    It is my recommendation that Suad Gongora remain out of work until 2020 due to medical concerns.      Sincerely,    Flakita Dawson MD

## 2021-06-21 ENCOUNTER — COMMUNICATION - HEALTHEAST (OUTPATIENT)
Dept: FAMILY MEDICINE | Facility: CLINIC | Age: 41
End: 2021-06-21

## 2021-06-21 DIAGNOSIS — M32.9 DISEASE OF GINGIVA DUE TO LUPUS ERYTHEMATOSUS (H): ICD-10-CM

## 2021-06-21 DIAGNOSIS — K06.9 DISEASE OF GINGIVA DUE TO LUPUS ERYTHEMATOSUS (H): ICD-10-CM

## 2021-06-21 RX ORDER — OXYCODONE AND ACETAMINOPHEN 10; 325 MG/1; MG/1
TABLET ORAL
Qty: 20 TABLET | Refills: 0 | Status: SHIPPED | OUTPATIENT
Start: 2021-06-21

## 2021-06-21 NOTE — LETTER
Letter by Flakita Dawson MD at      Author: Flakita Dawson MD Service: -- Author Type: --    Filed:  Encounter Date: 1/26/2021 Status: (Other)         [facility]  01/26/21    Patient: Suad Gongora  YOB: 1980  Medical Record Number: [id number]                                                                  Opioid / Opioid Plus Controlled Substance Agreement    I understand that my care provider has prescribed an opioid (narcotic) controlled substance to help manage my condition(s). I am taking this medicine to help me function or work. I know this is strong medicine, and that it can cause serious side effects. Opioid medicine can be sedating, addicting and may cause a dependency on the drug. They can affect my ability to drive or think, and cause depression. They need to be taken exactly as prescribed. Combining opioids with certain medicines or chemicals (such as cocaine, sedatives and tranquilizers, sleeping pills, meth) can be dangerous or even fatal. Also, if I stop opioids suddenly, I may have severe withdrawal symptoms. Last, I understand that opioids do not work for all types of pain nor for all patients. If not helpful, I may be asked to stop them.        The risks, benefits, and side effects of these medicine(s) were explained to me. I agree that:    1. I will take part in other treatments as advised by my care team. This may be psychiatry or counseling, physical therapy, behavioral therapy, group treatment or a referral to a pain clinic. I will reduce or stop my medicine when my care team tells me to do so.  2. I will take my medicines as prescribed. I will not change the dose or schedule unless my care team tells me to. There will be no refills if I run out early.  I may be contactedwithout warning and asked to complete a urine drug test or pill count at any time.   3. I will keep all my appointments, and understand this is part of the monitoring  of opioids. My care team may require an office visit for EVERY opioid/controlled substance refill. If I miss appointments or dont follow instructions, my care team may stop my medicine.  4. I will not ask other providers to prescribe controlled substances, and I will not accept controlled substances from other people. If I need another prescribed controlled substance for a new reason, I will tell my care team within 1 business day.  5. I will use one pharmacy to fill all of my controlled substance prescriptions, and it is up to me to make sure that I do not run out of my medicines on weekends or holidays. If my care team is willing to refill my opioid prescription without a visit, I must request refills only during office hours, refills may take up to 3 days to process, and it may take up to 5 to 7 days for my medicine to be mailed and ready at my pharmacy. Prescriptions will not be mailed anywhere except my pharmacy.        [id number]  Rev 12/18         Registration to scan to EHR                             Page 1 of 2               Controlled Substance Agreement [facility]  01/26/21  Patient: Suad Gongora  YOB: 1980  Medical Record Number: [id number]                                                                  6. I am responsible for my prescriptions. If the medicine/prescription is lost or stolen, it will not be replaced. I also agree not to share controlled substance medicines with anyone.  7. I agree to not use ANY illegal or recreational drugs. This includes marijuana, cocaine, bath salts or other drugs. I agree not to use alcohol unless my care team says I may.          I agree to give urine samples whenever asked. If I dont give a urine sample, the care team may stop my medicine.    8. If I enroll in the [place] Medical Marijuana program, I will tell my care team. I will also sign an agreement to share my medical records with my care team.   9. I will  bring in my list of medicines (or my medicine bottles) each time I come to the clinic.   10. I will tell my care team right away if I become pregnant or have a new medical problem treated outside of my regular clinic.  11. I understand that this medicine can affect my thinking and judgment. It may be unsafe for me to drive, use machinery and do dangerous tasks. I will not do any of these things until I know how the medicine affects me. If my dose changes, I will wait to see how it affects me. I will contact my care team if I have concerns about medicine side effects.    I understand that if I do not follow any of the conditions above, my prescriptions or treatment may be stopped.      I agree that my provider, clinic care team, and pharmacy may work with any city, state or federal law enforcement agency that investigates the misuse, sale, or other diversion of my controlled medicine. I will allow my provider to discuss my care with or share a copy of this agreement with any other treating provider, pharmacy or emergency room where I receive care. I agree to give up (waive) any right of privacy or confidentiality with respect to these consents.     I have read this agreement and have asked questions about anything I did not understand.    Percocet 10/325 mg  - Take 1 every 6-8 hours as needed for pain  #20 every month  Visits every 3 months.      ________________________________________________________________________  Patient signature - Date/Time -  Suad Gongora                                      ________________________________________________________________________  Witness signature                                                            ________________________________________________________________________  Provider signature - Flakita Dawson MD      [id number]  Rev 12/18         Registration to scan to EHR                         Page 2 of 2                   Controlled Substance Agreement  Opioid           Page 1 of 2  Opioid Pain Medicines (also known as Narcotics)  What You Need to Know    What are opioids?   Opioids are pain medicines that must be prescribed by a doctor.  They are also known as narcotics.    Examples are:     morphine (MS Contin, Magdalena)    oxycodone (Oxycontin)    oxycodone and acetaminophen (Percocet)    hydrocodone and acetaminophen (Vicodin, Norco)     fentanyl patch (Duragesic)     hydromorphone (Dilaudid)     methadone     What do opioids do well?   Opioids are best for short-term pain after a surgery or injury. They also work well for cancer pain. Unlike other pain medicines, they do not cause liver or kidney failure or ulcers. They may help some people with long-lasting (chronic) pain.     What do opioids NOT do well?   Opioids never get rid of pain entirely, and they do not work well for most patients with chronic pain. Opioids do not reduce swelling, one of the causes of pain. They also dont work well for nerve pain.                           For informational purposes only.  Not to replace the advice of your care provider.  Copyright [facility]. All right reserved. [employer] 082410-Arv 02/18.      Page 2 of 2    Risks and side effects   Talk to your doctor before you start or decide to keep taking one of these medicines. Side effects include:    Lowering your breathing rate enough to cause death    Overdose, including death, especially if taking higher than prescribed doses    Long-term opioid use    Worse depression symptoms; less pleasure in things you usually enjoy    Feeling tired or sluggish    Slower thoughts or cloudy thinking    Being more sensitive to pain over time; pain is harder to control    Trouble sleeping or restless sleep    Changes in hormone levels (for example, less testosterone)    Changes in sex drive or ability to have sex    Constipation    Unsafe driving    Itching and sweating    Feeling dizzy    Nausea, vomiting and dry  mouth    What else should I know about opioids?  When someone takes opioids for too long or too often, they become dependent. This means that if you stop or reduce the medicine too quickly, you will have withdrawal symptoms.    Dependence is not the same as addiction. Addiction is when people keep using a substance that harms their body, their mind or their relations with others. If you have a history of drug or alcohol abuse, taking opioids can cause a relapse.    Over time, opioids dont work as well. Most people will need higher and higher doses. The higher the dose, the more serious the side effects. We dont know the long-term effects of opioids.      Prescribed opioids aren't the best way to manage chronic pain    Other ways to manage pain include:      Ibuprofen or acetaminophen.  You should always try this first.      Treat health problems that may be causing pain.      acupuncture or massage, deep breathing, meditation, visual imagery, aromatherapy.      Use heat or ice at the pain site      Physical therapy and exercise      Stop smoking      See a counselor or therapist                                                  People who have used opioids for a long time may have a lower quality of life, worse depression, higher levels of pain and more visits to doctors.    Never share your opioids with others. Be sure to store opioids in a secure place, locked if possible.Young children can easily swallow them and overdose.     You can overdose on opioids.  Signs of overdose include decrease or loss of consciousness, slowed breathing, trouble waking and blue lips.  If someone is worried about overdose, they should call 911.    If you are at risk for overdose, you may get naloxone (Narcan, a medicine that reverses the effects of opioids.  If you overdose, a friend or family member can give you Narcan while waiting for the ambulance.  They need to know the signs of overdose and how to give Narcan.    While you're  taking opioids:    Don't use alcohol or street drugs. Taking them together can cause death.    Don't take any of these medicines unless your doctor says its okay.  Taking these with opioids can cause death.    Benzodiazepines (such as lorazepam         or diazepam)    Muscle relaxers (such as cyclobenzaprine)    sleeping pills    other opioids    Safe disposal of opioids  Find your area drug take-back program, your pharmacy mail-back program, buy a special disposal bag (such as Deterra) from your pharmacy or flush them down the toilet.  Use the guidelines at:  www.fda.gov/drugs/resourcesforyou

## 2021-06-21 NOTE — LETTER
Letter by Jake Burroughs DO at      Author: Jake Burroughs DO Service: -- Author Type: --    Filed:  Encounter Date: 3/2/2021 Status: (Other)                   Office Hours: Monday - Friday 8:00 - 4:30PM    Suad Gongora  [address] [place] [address]           March 2, 2021      Dear Suad:    Dr. Burroughs placed a referral for for you to meet with a hematologist. Unfortunately we have been unable to reach you by phone. If you would like to schedule this please call [phone]         Sincerely,        [facility]

## 2021-06-23 NOTE — TELEPHONE ENCOUNTER
I spoke to Suad, giving her the message from Dr. Toro.    Suad states she is taking the iron supplement without adverse sx.    What is next step?

## 2021-06-23 NOTE — TELEPHONE ENCOUNTER
Dr. Dawson wll be in tomorrow.  Her hemoglobin (iron level ) is even lower than before, platelet count is unchanged.  Flakita Dawson MD in her note write of Suad's difficulty with taking iron supplements.  Dr. Klein may consider iron infusion, but I will let her discuss that

## 2021-06-23 NOTE — TELEPHONE ENCOUNTER
Please tell pt that lipids were elevated. Reviewed labs with pt- she is taking iron twice a day.  Scheduled f/u visit Feb 6 at 10 am.

## 2021-06-23 NOTE — TELEPHONE ENCOUNTER
Test Results  Who is calling?:  Patient   Who ordered the test:  PCP  Type of test: Lab  Date of test:  01/17/19  Where was the test performed:  [facility]   What are your questions/concerns?: Patient is awaiting results. Alerts have been noted   Please advise   Okay to leave a detailed message?:  Yes

## 2021-06-23 NOTE — TELEPHONE ENCOUNTER
RN cannot approve Refill Request    RN can NOT refill this medication med is not covered by policy/route to provider. Last office visit: 1/17/2019 Flakita Dawson MD Last Physical: Visit date not found Last MTM visit: Visit date not found Last visit same specialty: 1/17/2019 Flakita Dawson MD.  Next visit within 3 mo: Visit date not found  Next physical within 3 mo: Visit date not found      Sumi Brown, [facility] Triage/Med Refill 2/11/2019    Requested Prescriptions   Pending Prescriptions Disp Refills     viscous lidocaine HC (LIDOCAINE) 2 % Soln viscous solution [Pharmacy Med Name: LIDOCAINE 2% VISC ORAL SOLUTION] 200 mL 0     Sig: RINSE AND GARGLE WITH 5ML BY MOUTH EVERY 2 HOURS AS NEEDED    There is no refill protocol information for this order

## 2021-06-23 NOTE — PROGRESS NOTES
SUBJECTIVE: Suad Gongora is a 38 y.o. female with:  Chief Complaint   Patient presents with     Medication Refill     Insomnia     Chills     x 1 month   She is here to f/u on connective tissue disease/ recurrent mouth sores/ anemia.  Has not been seen for several months. Pt having lot of housing problems.  Has been staying with lots of different people.  Moved to [place] at one time.  Very stressed.  Not sleeping well.  Eating ok - no weight loss.    1) Outbreaks on her lips doing much better since starting prednisone. She is only getting a single sore every few weeks.  Having a lot of soreness around the gum and jaw line.  She will have to eat on right side of her mouth when gums are sore.  She is using ora-gel because she did not have the lidocaine.  She is getting some blistering on pubic area - comes and goes.  She has been using oxycodone for severe pain.  She would like to have some of the medicine on hand as she is still getting a lot of oral pain at times.    2) She saw rheumatology several months ago and was tapered down on prednisone to 5 mg daily.  She feels it has been helpful with her symptoms.  Has not got a definite rheum diagnosis- positive ELDA/ skin biopsy positive for lupus.  She ran out of prednisone a couple of weeks ago.  She has been taking Plaquenil off and on. She feels both medicines are helpful. She is overdue to return to rheumatology.    3) She has Mirena IUD but it is due to be removed.  She has been having more heavy flow.  Can't find her IUD string on exam.    4) Has past history of anemia.  Has trouble taking iron.  Did require a transfusion in the past.  Had normal pelvic u/s in 4/18 with IUD in place.    5) Has URI.  Coughing a lot.  No fevers/ chills.    6) Not sleeping well.  Asking for medication to help with sleep.    SH: Single.  Has children.  Currently homeless.      OBJECTIVE: /84 (Patient Site: Left Arm, Patient Position: Sitting, Cuff Size: Adult Regular)   Pulse  "91   Temp 98.1  F (36.7  C)   Ht 5' 6\" (1.676 m)   Wt 138 lb 1.9 oz (62.7 kg)   LMP 01/16/2019   SpO2 100%   BMI 22.29 kg/m     No acute distress.  HEENT: Head is atraumatic and/normocephalic.  PERRL.  Conjunctiva clear.  Tympanic membranes grey with normal landmarks and normal light reflexes.  No nasal discharge.  Oropharynx is pink and moist.  Sinuses nontender.  Neck: Supple.  No lymphadenopathy or thyromegaly.  Lungs: Clear to auscultation.  No wheezing, retractions, or tachypnea.  Heart: RRR. S1 and S2 normal.  No murmurs, rubs, or gallops.  Neuro: Awake, alert, oriented x 3.  Normal strength and tone.  Normal gait.   GYN:  Normal external genitalia.  Vagina pink with bloody discharge.  Cervix pink - unable to visualize IUD string.    Lab Results   Component Value Date    HGB 8.1 (L) 01/17/2019     Suad was seen today for medication refill, insomnia and chills.    Diagnoses and all orders for this visit:    Undifferentiated connective tissue disease (H)- Recheck labs today.  Continue prednisone 5 mg daily/ Plaquenil .  Switch omeprazole to ranitidine.  Needs f/u with rheumatology.  -     HM1(CBC and Differential)  -     Comprehensive Metabolic Panel  -     C-Reactive Protein  -     HM1 (CBC with Diff)  -     predniSONE (DELTASONE) 5 MG tablet; Take 1 PO daily.  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  I would like her to try and cut back on her use of oxycodone now that the oral ulcers are better.  -     viscous lidocaine HC (LIDOCAINE) 2 % Soln viscous solution; RINSE AND GARGLE WITH 5ML BY MOUTH EVERY 2 HOURS AS NEEDED  -     oxyCODONE-acetaminophen (PERCOCET)  mg per tablet; Take 1 every 6-8 hours as needed for severe pain    Ulcer aphthous oral  -     predniSONE (DELTASONE) 5 MG tablet; Take 1 PO daily.  -     oxyCODONE-acetaminophen (PERCOCET)  mg per " tablet; Take 1 every 6-8 hours as needed for severe pain    Iron deficiency anemia, unspecified iron deficiency anemia type- Severe.  I suspect due to menstrual blood loss/ poor absorption.   May also need GI work-up.  Close follow-up.    -     ferrous gluconate (FERGON) 324 MG tablet; Take 1 po BID    Intrauterine contraceptive device threads lost, initial encounter  -     US Pelvis With Transvaginal Non OB; Future    Insomnia, unspecified type  -     traZODone (DESYREL) 50 MG tablet; Take 1 tablet (50 mg total) by mouth at bedtime.         Patient Instructions   Follow up in 2 weeks    Refill prednisone 5 mg day.    Take iron 2x a day.    Stay on hydroxychloroquine.    Follow- up with rheumatology.     40 minutes spent with the patient.  Over 50% were spent in counseling and coordination of care.    Flakita Dawson

## 2021-06-24 NOTE — TELEPHONE ENCOUNTER
Controlled Substance Refill Request  Medication Name:   Requested Prescriptions     Pending Prescriptions Disp Refills     oxyCODONE-acetaminophen (PERCOCET)  mg per tablet 20 tablet 0     Sig: Take 1 every 6-8 hours as needed for severe pain     Date Last Fill: 1/17/19  [facility]      Submit electronically to pharmacy  Controlled Substance Agreement Date Scanned:   Encounter-Level CSA Scan Date - 04/23/2018:    Scan on 4/25/2018 11:57 AM (below)             Encounter-Level CSA Scan Date - 12/05/2016:    Scan on 12/5/2016 (below)         Last office visit with prescriber/PCP: 1/17/2019 Flakita Dawson MD OR same dept: 1/17/2019 Flakita Dawson MD OR same specialty: 1/17/2019 Flakita Dawson MD  Last physical: Visit date not found Last MTM visit: Visit date not found

## 2021-06-24 NOTE — TELEPHONE ENCOUNTER
Refill Request  Did you contact pharmacy: Yes, patient states that she'd asked pharmacy for these refills as well, but informed patient that I only see the below request for Lidocaine, but in addition patient also requests refills for...  Medication name:   oxyCODONE-acetaminophen (PERCOCET)  mg per tablet    predniSONE (DELTASONE) 5 MG tablet  Who prescribed the medication: Flkaita Dawson MD  Pharmacy Name and Location: [id number] - [place], [place] - [address]   Is patient out of medication: Yes  Patient notified refills processed in 72 hours:  yes  Okay to leave a detailed message: yes

## 2021-06-24 NOTE — TELEPHONE ENCOUNTER
Who is calling:  Patient   Reason for Call:  States she currently has a vaginal outbreak and is requesting the medication.   Date of last appointment with primary care:1/17/19  Has the patient been recently seen:  Yes  Okay to leave a detailed message: Yes

## 2021-06-24 NOTE — TELEPHONE ENCOUNTER
Final attempt: number is not a working number. No other number on file.     If patient calls back please update a current working number for future references.     ANNEMARIE/JAZMINE

## 2021-06-24 NOTE — TELEPHONE ENCOUNTER
Controlled Substance Refill Request  Medication Name:   Requested Prescriptions     Pending Prescriptions Disp Refills     oxyCODONE-acetaminophen (PERCOCET)  mg per tablet 20 tablet 0     Sig: Take 1 every 6-8 hours as needed for severe pain     Date Last Fill: 1/17/2019  Pharmacy: [id number]      Submit electronically to pharmacy  Controlled Substance Agreement Date Scanned:   Encounter-Level CSA Scan Date - 04/23/2018:    Scan on 4/25/2018 11:57 AM (below)             Encounter-Level CSA Scan Date - 12/05/2016:    Scan on 12/5/2016 (below)         Last office visit with prescriber/PCP: 1/17/2019 Flakita Dawson MD OR same dept: 1/17/2019 Flakita Dawson MD OR same specialty: 1/17/2019 Flakita Dawson MD  Last physical: Visit date not found Last MTM visit: Visit date not found        Last RX sent to wrong pharmacy. Patient is no longer in [place] and needs RX at [facility] to be cancelled and new RX send to [facility] on [address].

## 2021-06-24 NOTE — TELEPHONE ENCOUNTER
"Tried calling patient. When dialing it says \"this number is not a working number.\"   If patient calls back please relay message:  No sooner appointments with Dr. Dawson at the moment. Patient is able to schedule with another provider that has sooner appointments.   "

## 2021-06-24 NOTE — TELEPHONE ENCOUNTER
"New Appointment Needed  What is the reason for the visit:    Same Date/Next Day Appt Request  What is the reason for your visit?: f/u on Iron levels and a \"personal issue.\"    Provider Preference: PCP only  How soon do you need to be seen?: sooner than next available, which is 04/03/19  Waitlist offered?: Yes  Okay to leave a detailed message:  Yes    "

## 2021-06-24 NOTE — TELEPHONE ENCOUNTER
Controlled Substance Refill Request  Medication:   Requested Prescriptions     Pending Prescriptions Disp Refills     viscous lidocaine HC (LIDOCAINE) 2 % Soln viscous solution [Pharmacy Med Name: LIDOCAINE 2% VISC ORAL SOLUTION] 200 mL 0     Sig: RINSE AND GARGLE WITH 5ML BY MOUTH EVERY 2 HOURS AS NEEDED     Date Last Fill: 2.13.19  Pharmacy: [facility]   Submit electronically to pharmacy  Controlled Substance Agreement on File:   Encounter-Level CSA Scan Date - 04/23/2018:    Scan on 4/25/2018 11:57 AM (below)             Encounter-Level CSA Scan Date - 12/05/2016:    Scan on 12/5/2016 (below)         Last office visit: Last office visit pertaining to requested medication was 1.17.19.

## 2021-06-26 NOTE — TELEPHONE ENCOUNTER
Reason for Call:  Medication or medication refill:    Do you use a [facility]?  Name of the pharmacy and phone number for the current request: [id number] - [place], [place] - [address] AT [facility]    Name of the medication requested: oxyCODONE-acetaminophen (PERCOCET)  mg per tablet    Other request: na    Can we leave a detailed message on this number? Yes    Phone number patient can be reached at: Cell number on file:    Telephone Information:   Mobile [phone]       Best Time: na    Call taken on 6/21/2021 at 12:40 PM by Esperanza Ness

## 2021-06-26 NOTE — TELEPHONE ENCOUNTER
RN cannot approve Refill Request    RN can NOT refill this medication med is not covered by policy/route to provider. Last office visit: 1/20/2020 Flakita Dawson MD Last Physical: Visit date not found Last MTM visit: Visit date not found Last visit same specialty: 1/20/2020 Flakita Dawson MD.  Next visit within 3 mo: Visit date not found  Next physical within 3 mo: Visit date not found      [facility] Triage/Med Refill 6/20/2021    Requested Prescriptions   Pending Prescriptions Disp Refills     viscous lidocaine HC (LIDOCAINE) 2 % Soln viscous solution [Pharmacy Med Name: LIDOCAINE 2% VISC ORAL SOLUTION] 200 mL 2     Sig: SWISH WITH 5ML BY MOUTH EVERY 2 HOURS AS NEEDED       There is no refill protocol information for this order

## 2021-07-03 NOTE — ADDENDUM NOTE
Addendum Note by Flakita Boudreaux MD at 5/22/2019  9:09 AM     Author: Flakita Boudreaux MD Service: -- Author Type: Physician    Filed: 5/22/2019  9:09 AM Encounter Date: 5/16/2019 Status: Signed    : Flakita Boudreaux MD (Physician)    Addended by: FLAKITA BOUDREAUX on: 5/22/2019 09:09 AM        Modules accepted: Orders

## 2021-07-03 NOTE — ADDENDUM NOTE
Addendum Note by Travis Richardson at 6/9/2018 10:12 AM     Author: Travis Richardson Service: -- Author Type:     Filed: 6/9/2018 10:12 AM Encounter Date: 6/5/2018 Status: Signed    : Travis Richardson ()    Addended by: TRAVIS RICHARDSON on: 6/9/2018 10:12 AM        Modules accepted: Orders

## 2021-07-03 NOTE — ADDENDUM NOTE
Addendum Note by Flakita Boudreaux MD at 4/24/2018  6:13 PM     Author: Flakita Boudreaux MD Service: -- Author Type: Physician    Filed: 4/24/2018  6:13 PM Encounter Date: 4/23/2018 Status: Signed    : Flakita Boudreaux MD (Physician)    Addended by: FLAKITA BOUDREAUX on: 4/24/2018 06:13 PM        Modules accepted: Orders

## 2021-07-04 NOTE — ADDENDUM NOTE
Addendum Note by Natasha Iglesias RN at 2/3/2021  3:50 PM     Author: Natasha Iglesias RN Service: -- Author Type: Registered Nurse    Filed: 2/3/2021  3:50 PM Encounter Date: 1/26/2021 Status: Signed    : Natasha Iglesias RN (Registered Nurse)    Addended by: NATASHA IGLESIAS on: 2/3/2021 03:50 PM        Modules accepted: Orders

## 2021-07-21 DIAGNOSIS — K06.9 DISEASE OF GINGIVA DUE TO LUPUS ERYTHEMATOSUS (H): ICD-10-CM

## 2021-07-21 DIAGNOSIS — M32.9 DISEASE OF GINGIVA DUE TO LUPUS ERYTHEMATOSUS (H): ICD-10-CM

## 2021-07-21 NOTE — TELEPHONE ENCOUNTER
Reason for Call:  Patient is calling for a refill of  oxyCODONE-acetaminophen (PERCOCET)  mg per tablet 20 tablet 0 5/20/2021  No   Sig: [OXYCODONE-ACETAMINOPHEN (PERCOCET)  MG PER TABLET] Take 1 every 6-8 hours as needed for severe pain       SEND TO [facility] [place]     Detailed comments: last seen 04/2021    Phone Number Patient can be reached at: Cell number on file:    Telephone Information:   Mobile [phone]   Mobile [phone]       Best Time: any    Can we leave a detailed message on this number? YES    Call taken on 7/21/2021 at 4:21 PM by Marcelina Morales

## 2021-07-22 RX ORDER — OXYCODONE AND ACETAMINOPHEN 10; 325 MG/1; MG/1
TABLET ORAL
Qty: 20 TABLET | Refills: 0 | Status: SHIPPED | OUTPATIENT
Start: 2021-07-22

## 2021-08-03 DIAGNOSIS — H01.003 BLEPHARITIS OF BOTH EYES, UNSPECIFIED EYELID, UNSPECIFIED TYPE: ICD-10-CM

## 2021-08-03 DIAGNOSIS — H01.006 BLEPHARITIS OF BOTH EYES, UNSPECIFIED EYELID, UNSPECIFIED TYPE: ICD-10-CM

## 2021-08-04 DIAGNOSIS — K12.0 ORAL APHTHAE: Primary | ICD-10-CM

## 2021-08-05 RX ORDER — LIDOCAINE HYDROCHLORIDE 20 MG/ML
SOLUTION OROPHARYNGEAL
Qty: 200 ML | Refills: 3 | Status: SHIPPED | OUTPATIENT
Start: 2021-08-05

## 2021-08-05 NOTE — TELEPHONE ENCOUNTER
Patient is calling for follow up on refill request,states has a hard time talking without it. Will r/s when she feels better.

## 2021-08-07 RX ORDER — ERYTHROMYCIN 5 MG/G
OINTMENT OPHTHALMIC
Qty: 3.5 G | Refills: 0 | Status: SHIPPED | OUTPATIENT
Start: 2021-08-07

## 2021-08-07 NOTE — TELEPHONE ENCOUNTER
Routing refill request to provider for review/approval because:  Drug not on the [facility] refill protocol   ___________________________________________________________    Copy of Last Rx:        Last office visit with provider:  4/26/21   ___________________________________________________________    Requested Prescriptions   Pending Prescriptions Disp Refills     erythromycin (ROMYCIN) 5 MG/GM ophthalmic ointment [Pharmacy Med Name: ERYTHROMYCIN OPHTH OINT 3.5GM] 3.5 g 0     Sig: APPLY TOPICALLY TO THE AFFECTED AREA TWICE DAILY       There is no refill protocol information for this order          Mallory Bhatt RN 08/06/21 7:27 PM

## 2021-08-15 ENCOUNTER — HEALTH MAINTENANCE LETTER (OUTPATIENT)
Age: 41
End: 2021-08-15

## 2021-08-31 ENCOUNTER — TELEPHONE (OUTPATIENT)
Dept: FAMILY MEDICINE | Facility: CLINIC | Age: 41
End: 2021-08-31

## 2021-08-31 NOTE — TELEPHONE ENCOUNTER
Call pt - She is supposed to follow-up every 3 months so I cannot refill narcotics.  Would be best to establish with a new provider so she can keep these visits required for her pain medicine.

## 2021-08-31 NOTE — TELEPHONE ENCOUNTER
Reason for Call:  Patient is calling for a refill of    oxyCODONE-acetaminophen (PERCOCET)  MG per tablet    SEND TO [facility]    Detailed comments: LAST SEEN 04/2021.  She would like to let  know that she is going to be est care with a different provider closer to her home. [place] is just too far for her.    Phone Number Patient can be reached at: Home number on file [phone] (home)    Best Time: any    Can we leave a detailed message on this number? YES    Call taken on 8/31/2021 at 3:15 PM by Marcelina Morales

## 2021-10-10 ENCOUNTER — HEALTH MAINTENANCE LETTER (OUTPATIENT)
Age: 41
End: 2021-10-10

## 2021-11-22 ENCOUNTER — TELEPHONE (OUTPATIENT)
Dept: FAMILY MEDICINE | Facility: CLINIC | Age: 41
End: 2021-11-22
Payer: COMMERCIAL

## 2021-11-22 NOTE — TELEPHONE ENCOUNTER
Reason for Call:  Other call back    Detailed comments: pt would like to stop by and pickup a copy of her immunziation record    Pt was a former patient of Dr Dawson, but would like to come to the [facility] and pickup record    Please advise    Phone Number Patient can be reached at:   [phone]    Best Time: anytime    Can we leave a detailed message on this number? YES    Call taken on 11/22/2021 at 12:28 PM by Shweta Morley

## 2022-08-31 DIAGNOSIS — D50.0 IRON DEFICIENCY ANEMIA DUE TO CHRONIC BLOOD LOSS: ICD-10-CM

## 2022-09-01 RX ORDER — FERROUS SULFATE 325(65) MG
TABLET ORAL
Qty: 180 TABLET | Refills: 0 | Status: SHIPPED | OUTPATIENT
Start: 2022-09-01

## 2022-09-01 NOTE — TELEPHONE ENCOUNTER
"Routing refill request to provider for review/approval because:  A break in medication  Patient needs to be seen because it has been more than 1 year since last office visit.    Last Written Prescription Date:  2/24/21  Last Fill Quantity: 60,  # refills: 3   Last office visit provider:  4/26/21     Requested Prescriptions   Pending Prescriptions Disp Refills     FEROSUL 325 (65 Fe) MG tablet [Pharmacy Med Name: FERROUS SULFATE 325MG (5GR) TABS] 60 tablet 3     Sig: TAKE 1 TABLET(325 MG) BY MOUTH TWICE DAILY       Iron Supplements Failed - 9/1/2022  7:12 AM        Failed - Recent (12 mo) or future (30 days) visit within the authorizing provider's specialty     Patient has had an office visit with the authorizing provider or a provider within the authorizing providers department within the previous 12 mos or has a future within next 30 days. See \"Patient Info\" tab in inbasket, or \"Choose Columns\" in Meds & Orders section of the refill encounter.              Failed - Hgb OR Hct on record within the past 12 mos.     Patient need only have had a HGB or HCT on file in the past 12 mos. That result does not need to be normal.    Recent Labs   Lab Test 04/02/21  1544 02/23/21  1441 01/20/20  1128   HGB 9.4* 7.6* 9.7*     Recent Labs   Lab Test 04/02/21  1544 02/23/21  1441 01/20/20  1128   HCT 30.9* 25.0* 30.9*       Please verify a HGB or HCT has been checked SINCE THE LAST DOSE CHANGE.            Passed - Patient is 12 years of age or older        Passed - Medication is active on med list             Greg Arredondo RN 09/01/22 7:13 AM  "

## 2022-09-18 ENCOUNTER — HEALTH MAINTENANCE LETTER (OUTPATIENT)
Age: 42
End: 2022-09-18

## 2023-07-12 ENCOUNTER — TELEPHONE (OUTPATIENT)
Dept: INTERNAL MEDICINE | Facility: CLINIC | Age: 43
End: 2023-07-12
Payer: COMMERCIAL

## 2023-07-12 NOTE — TELEPHONE ENCOUNTER
----- Message from Compa Rico MD sent at 7/12/2023 12:04 PM CDT -----  Pt was scheduled with me tomorrow for new pt.  I am not accepting new pts.  Needs rescheduled with someone else.  Thanks.

## 2023-07-12 NOTE — TELEPHONE ENCOUNTER
07/12 called and left message at 1:00 cancelling appt for Thursday with  he is not open to new pt's was told to call back to schedule with a NP

## 2023-10-08 ENCOUNTER — HEALTH MAINTENANCE LETTER (OUTPATIENT)
Age: 43
End: 2023-10-08

## 2024-02-25 ENCOUNTER — HEALTH MAINTENANCE LETTER (OUTPATIENT)
Age: 44
End: 2024-02-25

## 2025-02-28 NOTE — PATIENT INSTRUCTIONS - HE
Follow up in 2 weeks    Refill prednisone 5 mg day.    Take iron 2x a day.    Stay on hydroxychloroquine.    Follow- up with rheumatology.   LINDA.